# Patient Record
Sex: FEMALE | Race: BLACK OR AFRICAN AMERICAN | NOT HISPANIC OR LATINO | Employment: FULL TIME | URBAN - METROPOLITAN AREA
[De-identification: names, ages, dates, MRNs, and addresses within clinical notes are randomized per-mention and may not be internally consistent; named-entity substitution may affect disease eponyms.]

---

## 2020-05-22 ENCOUNTER — HOSPITAL ENCOUNTER (OUTPATIENT)
Dept: RADIOLOGY | Facility: HOSPITAL | Age: 50
Discharge: HOME | End: 2020-05-22
Attending: INTERNAL MEDICINE
Payer: OTHER GOVERNMENT

## 2020-05-22 DIAGNOSIS — Z12.31 VISIT FOR SCREENING MAMMOGRAM: ICD-10-CM

## 2020-05-22 PROCEDURE — 77063 MAMMO DIGITAL SCREENING BILAT WITH TOMOSYNTHESIS_CAD: ICD-10-PCS | Mod: 26,,, | Performed by: RADIOLOGY

## 2020-05-22 PROCEDURE — 77067 SCR MAMMO BI INCL CAD: CPT | Mod: TC

## 2020-05-22 PROCEDURE — 77063 BREAST TOMOSYNTHESIS BI: CPT | Mod: 26,,, | Performed by: RADIOLOGY

## 2020-05-22 PROCEDURE — 77067 MAMMO DIGITAL SCREENING BILAT WITH TOMOSYNTHESIS_CAD: ICD-10-PCS | Mod: 26,,, | Performed by: RADIOLOGY

## 2020-05-22 PROCEDURE — 77067 SCR MAMMO BI INCL CAD: CPT | Mod: 26,,, | Performed by: RADIOLOGY

## 2020-08-04 LAB — HUMAN PAPILLOMAVIRUS (HPV): NORMAL

## 2020-10-06 ENCOUNTER — OFFICE VISIT (OUTPATIENT)
Dept: OTOLARYNGOLOGY | Facility: CLINIC | Age: 50
End: 2020-10-06
Payer: COMMERCIAL

## 2020-10-06 DIAGNOSIS — J30.89 NON-SEASONAL ALLERGIC RHINITIS, UNSPECIFIED TRIGGER: Primary | ICD-10-CM

## 2020-10-06 DIAGNOSIS — J32.3 CHRONIC SPHENOIDAL SINUSITIS: ICD-10-CM

## 2020-10-06 PROCEDURE — 99999 PR PBB SHADOW E&M-EST. PATIENT-LVL III: ICD-10-PCS | Mod: PBBFAC,,, | Performed by: ORTHOPAEDIC SURGERY

## 2020-10-06 PROCEDURE — 99999 PR PBB SHADOW E&M-EST. PATIENT-LVL III: CPT | Mod: PBBFAC,,, | Performed by: ORTHOPAEDIC SURGERY

## 2020-10-06 PROCEDURE — 99204 OFFICE O/P NEW MOD 45 MIN: CPT | Mod: S$GLB,,, | Performed by: ORTHOPAEDIC SURGERY

## 2020-10-06 PROCEDURE — 99204 PR OFFICE/OUTPT VISIT, NEW, LEVL IV, 45-59 MIN: ICD-10-PCS | Mod: S$GLB,,, | Performed by: ORTHOPAEDIC SURGERY

## 2020-10-06 PROCEDURE — 99213 OFFICE O/P EST LOW 20 MIN: CPT | Mod: PBBFAC | Performed by: ORTHOPAEDIC SURGERY

## 2020-10-06 RX ORDER — MONTELUKAST SODIUM 10 MG/1
10 TABLET ORAL NIGHTLY
COMMUNITY

## 2020-10-06 RX ORDER — TIZANIDINE HYDROCHLORIDE 4 MG/1
4 CAPSULE, GELATIN COATED ORAL 2 TIMES DAILY PRN
COMMUNITY
Start: 2020-08-26 | End: 2022-05-27

## 2020-10-06 RX ORDER — CELECOXIB 200 MG/1
200 CAPSULE ORAL
COMMUNITY
End: 2021-05-21

## 2020-10-06 RX ORDER — LINACLOTIDE 145 UG/1
145 CAPSULE, GELATIN COATED ORAL
COMMUNITY
Start: 2020-09-18 | End: 2021-11-03

## 2020-10-06 RX ORDER — ALBUTEROL SULFATE 90 UG/1
2 AEROSOL, METERED RESPIRATORY (INHALATION) EVERY 6 HOURS PRN
COMMUNITY

## 2020-10-06 RX ORDER — MULTIVITAMIN
1 TABLET ORAL
COMMUNITY

## 2020-10-06 RX ORDER — PREGABALIN 75 MG/1
75 CAPSULE ORAL
COMMUNITY
Start: 2020-10-02 | End: 2021-05-31

## 2020-10-06 RX ORDER — CETIRIZINE HYDROCHLORIDE 10 MG/1
10 TABLET ORAL NIGHTLY
COMMUNITY

## 2020-10-06 RX ORDER — OXYBUTYNIN CHLORIDE 5 MG/1
TABLET, EXTENDED RELEASE ORAL
COMMUNITY
Start: 2020-08-25 | End: 2020-12-23

## 2020-10-06 RX ORDER — GLUCOSAMINE/CHONDRO SU A 500-400 MG
2 TABLET ORAL
COMMUNITY
End: 2021-05-21

## 2020-10-06 NOTE — PROGRESS NOTES
Subjective:      Patient ID: Jolly Galeas is a 50 y.o. female.    Chief Complaint: Sinus Problem and Headache (Migranes )    Patient is a very pleasant 50-year-old female here to see me today for the 1st time for evaluation of several different issues.  First, she does have a significant history of migraines in the past for which she is on maximal medical management.  She has recently experienced several episodes of dizziness which she does identify as a true spinning sensation.  She does have allergies, and takes an antihistamine decongestant combination on a daily basis as well as Singulair.  She is not currently on Flonase, she is unsure of why she stopped taking it in the past but did find it helpful.  She did have allergy testing in 2016, and did have shots twice weekly for about a year and then monthly after that.  She has recently [facility] both an MRI as well as [facility] and is here today for further evaluation.        Review of Systems   Constitutional: Negative for chills, fatigue, fever and unexpected weight change.   HENT: Positive for congestion, postnasal drip, rhinorrhea and sinus pressure. Negative for dental problem, ear discharge, ear pain, facial swelling, hearing loss, nosebleeds, sneezing, sore throat, tinnitus, trouble swallowing and voice change.    Eyes: Negative for redness, itching and visual disturbance.   Respiratory: Negative for cough, choking, shortness of breath and wheezing.    Cardiovascular: Negative for chest pain and palpitations.   Gastrointestinal: Negative for abdominal pain.        No reflux.   Musculoskeletal: Negative for gait problem.   Skin: Negative for rash.   Allergic/Immunologic: Positive for environmental allergies.   Neurological: Positive for dizziness and headaches. Negative for light-headedness.       Objective:       Physical Exam  Constitutional:       General: She is not in acute distress.     Appearance: She is well-developed.   HENT:       Head: Normocephalic and atraumatic.      Right Ear: Tympanic membrane, ear canal and external ear normal.      Left Ear: Tympanic membrane, ear canal and external ear normal.      Nose: Mucosal edema and rhinorrhea present. No nasal deformity or septal deviation.      Right Sinus: No maxillary sinus tenderness or frontal sinus tenderness.      Left Sinus: No maxillary sinus tenderness or frontal sinus tenderness.      Mouth/Throat:      Mouth: Mucous membranes are not pale and not dry.      Dentition: No dental caries.      Pharynx: Uvula midline. No oropharyngeal exudate or posterior oropharyngeal erythema.   Eyes:      General: Lids are normal. No scleral icterus.     Extraocular Movements:      Right eye: Normal extraocular motion and no nystagmus.      Left eye: Normal extraocular motion and no nystagmus.      Conjunctiva/sclera: Conjunctivae normal.      Right eye: Right conjunctiva is not injected. No chemosis.     Left eye: Left conjunctiva is not injected. No chemosis.     Pupils: Pupils are equal, round, and reactive to light.   Neck:      Thyroid: No thyroid mass or thyromegaly.      Trachea: Trachea and phonation normal. No tracheal tenderness or tracheal deviation.   Pulmonary:      Effort: Pulmonary effort is normal. No respiratory distress.      Breath sounds: No stridor.   Abdominal:      General: There is no distension.   Lymphadenopathy:      Head:      Right side of head: No submental, submandibular, preauricular, posterior auricular or occipital adenopathy.      Left side of head: No submental, submandibular, preauricular, posterior auricular or occipital adenopathy.      Cervical: No cervical adenopathy.   Skin:     General: Skin is warm and dry.      Findings: No erythema or rash.   Neurological:      Mental Status: She is alert and oriented to person, place, and time.      Cranial Nerves: No cranial nerve deficit.   Psychiatric:         Behavior: Behavior normal.     CT SINUS:          MRI  HEAD:      Assessment:       1. Non-seasonal allergic rhinitis, unspecified trigger    2. Chronic sphenoidal sinusitis        Plan:     Non-seasonal allergic rhinitis, unspecified trigger:   I would recommend the patient begin to use a steroid nasal spray, and we discussed in detail the proper mechanism of use directing the spray away from the nasal septum.  In addition, we also discussed that it will take two to three weeks of daily use to achieve maximal effectiveness.  Nasal steroid sprays are now available in a variety of brands OTC.  The patient will please call in 2-3 weeks with their progress.  If allergy symptoms persist at that time, we could consider additional allergy testing.    Chronic sphenoidal sinusitis  -     CT Sinuses without Contrast; Future; Expected date: 10/06/2020    Discussed that her symptoms of dizziness and headache her most likely related to her known diagnosis of migraines.  Incidental finding of a small mucous retention cyst in the sphenoid.  Will repeat imaging in 6 months to ensure stability, otherwise no further evaluation or intervention is indicated.  Call for sooner appointment if needed.

## 2020-10-06 NOTE — LETTER
October 12, 2020      [facility]  [address] [place] [address]           [facility]  [address]  [place] [address]  Phone: [phone]  Fax: [phone]          Patient: Jolly Galeas   MR Number: [id number]   YOB: 1970   Date of Visit: 10/6/2020       Dear [facility]:    Thank you for referring Jolly Galeas to me for evaluation. Attached you will find relevant portions of my assessment and plan of care.    If you have questions, please do not hesitate to call me. I look forward to following Jolly Galeas along with you.    Sincerely,    Clarisa Flower MD    Enclosure  CC:  No Recipients    If you would like to receive this communication electronically, please contact [email or web address] or [phone] to request more information on [facility] access.    For providers and/or their staff who would like to refer a patient to [facility], please contact us through our one-stop-shop provider referral line, Enrique Costa, at [phone].    If you feel you have received this communication in error or would no longer like to receive these types of communications, please e-mail [email or web address]

## 2020-12-23 ENCOUNTER — LAB VISIT (OUTPATIENT)
Dept: LAB | Facility: HOSPITAL | Age: 50
End: 2020-12-23
Attending: FAMILY MEDICINE
Payer: OTHER GOVERNMENT

## 2020-12-23 ENCOUNTER — OFFICE VISIT (OUTPATIENT)
Dept: INTERNAL MEDICINE | Facility: CLINIC | Age: 50
End: 2020-12-23
Payer: OTHER GOVERNMENT

## 2020-12-23 VITALS
WEIGHT: 231.69 LBS | DIASTOLIC BLOOD PRESSURE: 78 MMHG | HEART RATE: 89 BPM | SYSTOLIC BLOOD PRESSURE: 110 MMHG | TEMPERATURE: 98 F | OXYGEN SATURATION: 96 %

## 2020-12-23 DIAGNOSIS — Z00.00 ROUTINE GENERAL MEDICAL EXAMINATION AT A HEALTH CARE FACILITY: Primary | ICD-10-CM

## 2020-12-23 DIAGNOSIS — Z00.00 ROUTINE GENERAL MEDICAL EXAMINATION AT A HEALTH CARE FACILITY: ICD-10-CM

## 2020-12-23 DIAGNOSIS — F41.9 ANXIETY: ICD-10-CM

## 2020-12-23 DIAGNOSIS — J45.40 MODERATE PERSISTENT ASTHMA WITHOUT COMPLICATION: ICD-10-CM

## 2020-12-23 DIAGNOSIS — G43.019 INTRACTABLE MIGRAINE WITHOUT AURA AND WITHOUT STATUS MIGRAINOSUS: ICD-10-CM

## 2020-12-23 DIAGNOSIS — G47.429 NARCOLEPSY DUE TO UNDERLYING CONDITION WITHOUT CATAPLEXY: ICD-10-CM

## 2020-12-23 DIAGNOSIS — M17.0 PRIMARY OSTEOARTHRITIS OF BOTH KNEES: ICD-10-CM

## 2020-12-23 DIAGNOSIS — K21.9 GASTROESOPHAGEAL REFLUX DISEASE, UNSPECIFIED WHETHER ESOPHAGITIS PRESENT: ICD-10-CM

## 2020-12-23 DIAGNOSIS — F31.32 BIPOLAR DISORDER WITH MODERATE DEPRESSION: ICD-10-CM

## 2020-12-23 DIAGNOSIS — K58.1 IRRITABLE BOWEL SYNDROME WITH CONSTIPATION: ICD-10-CM

## 2020-12-23 DIAGNOSIS — G47.33 OSA ON CPAP: ICD-10-CM

## 2020-12-23 DIAGNOSIS — J98.59 MEDIASTINAL MASS: ICD-10-CM

## 2020-12-23 DIAGNOSIS — Z91.09 ENVIRONMENTAL ALLERGIES: ICD-10-CM

## 2020-12-23 DIAGNOSIS — Z80.0 FAMILY HISTORY OF COLON CANCER: ICD-10-CM

## 2020-12-23 DIAGNOSIS — F43.10 PTSD (POST-TRAUMATIC STRESS DISORDER): ICD-10-CM

## 2020-12-23 DIAGNOSIS — R91.1 LUNG NODULE: ICD-10-CM

## 2020-12-23 PROBLEM — G43.909 MIGRAINE: Status: ACTIVE | Noted: 2020-12-23

## 2020-12-23 PROBLEM — J30.2 SEASONAL ALLERGIES: Status: ACTIVE | Noted: 2020-12-23

## 2020-12-23 PROBLEM — F43.12 CHRONIC POST-TRAUMATIC STRESS DISORDER (PTSD) AFTER MILITARY COMBAT: Status: RESOLVED | Noted: 2020-12-23 | Resolved: 2020-12-23

## 2020-12-23 PROBLEM — F32.A DEPRESSION: Status: RESOLVED | Noted: 2020-12-23 | Resolved: 2020-12-23

## 2020-12-23 PROBLEM — F33.1 MODERATE EPISODE OF RECURRENT MAJOR DEPRESSIVE DISORDER: Status: ACTIVE | Noted: 2017-10-09

## 2020-12-23 PROBLEM — G47.419 NARCOLEPSY: Status: ACTIVE | Noted: 2020-12-23

## 2020-12-23 PROBLEM — F32.A DEPRESSION: Status: ACTIVE | Noted: 2020-12-23

## 2020-12-23 PROBLEM — J45.909 UNCOMPLICATED ASTHMA: Status: RESOLVED | Noted: 2017-06-29 | Resolved: 2020-12-23

## 2020-12-23 PROBLEM — F31.9 BIPOLAR DISORDER: Status: ACTIVE | Noted: 2020-12-23

## 2020-12-23 PROBLEM — J30.2 SEASONAL ALLERGIES: Status: RESOLVED | Noted: 2020-12-23 | Resolved: 2020-12-23

## 2020-12-23 PROBLEM — K58.9 IRRITABLE BOWEL SYNDROME: Status: ACTIVE | Noted: 2020-12-23

## 2020-12-23 PROBLEM — G47.30 SLEEP APNEA: Status: ACTIVE | Noted: 2020-12-23

## 2020-12-23 PROBLEM — F43.12 CHRONIC POST-TRAUMATIC STRESS DISORDER (PTSD) AFTER MILITARY COMBAT: Status: ACTIVE | Noted: 2020-12-23

## 2020-12-23 PROBLEM — F33.1 MODERATE EPISODE OF RECURRENT MAJOR DEPRESSIVE DISORDER: Status: RESOLVED | Noted: 2017-10-09 | Resolved: 2020-12-23

## 2020-12-23 PROBLEM — J45.909 UNCOMPLICATED ASTHMA: Status: ACTIVE | Noted: 2017-06-29

## 2020-12-23 LAB
ALBUMIN SERPL BCP-MCNC: 3.7 G/DL (ref 3.5–5.2)
ALP SERPL-CCNC: 136 U/L (ref 55–135)
ALT SERPL W/O P-5'-P-CCNC: 148 U/L (ref 10–44)
ANION GAP SERPL CALC-SCNC: 8 MMOL/L (ref 8–16)
AST SERPL-CCNC: 240 U/L (ref 10–40)
BASOPHILS # BLD AUTO: 0.03 K/UL (ref 0–0.2)
BASOPHILS NFR BLD: 0.5 % (ref 0–1.9)
BILIRUB SERPL-MCNC: 0.4 MG/DL (ref 0.1–1)
BUN SERPL-MCNC: 12 MG/DL (ref 6–20)
CALCIUM SERPL-MCNC: 9.1 MG/DL (ref 8.7–10.5)
CHLORIDE SERPL-SCNC: 103 MMOL/L (ref 95–110)
CHOLEST SERPL-MCNC: 175 MG/DL (ref 120–199)
CHOLEST/HDLC SERPL: 4.1 {RATIO} (ref 2–5)
CO2 SERPL-SCNC: 29 MMOL/L (ref 23–29)
CREAT SERPL-MCNC: 1 MG/DL (ref 0.5–1.4)
DIFFERENTIAL METHOD: ABNORMAL
EOSINOPHIL # BLD AUTO: 0.1 K/UL (ref 0–0.5)
EOSINOPHIL NFR BLD: 2 % (ref 0–8)
ERYTHROCYTE [DISTWIDTH] IN BLOOD BY AUTOMATED COUNT: 14.8 % (ref 11.5–14.5)
EST. GFR  (AFRICAN AMERICAN): >60 ML/MIN/1.73 M^2
EST. GFR  (NON AFRICAN AMERICAN): >60 ML/MIN/1.73 M^2
GLUCOSE SERPL-MCNC: 97 MG/DL (ref 70–110)
HCT VFR BLD AUTO: 41.5 % (ref 37–48.5)
HDLC SERPL-MCNC: 43 MG/DL (ref 40–75)
HDLC SERPL: 24.6 % (ref 20–50)
HGB BLD-MCNC: 13.4 G/DL (ref 12–16)
IMM GRANULOCYTES # BLD AUTO: 0.01 K/UL (ref 0–0.04)
IMM GRANULOCYTES NFR BLD AUTO: 0.2 % (ref 0–0.5)
LDLC SERPL CALC-MCNC: 103.2 MG/DL (ref 63–159)
LYMPHOCYTES # BLD AUTO: 1.3 K/UL (ref 1–4.8)
LYMPHOCYTES NFR BLD: 20.8 % (ref 18–48)
MCH RBC QN AUTO: 27.7 PG (ref 27–31)
MCHC RBC AUTO-ENTMCNC: 32.3 G/DL (ref 32–36)
MCV RBC AUTO: 86 FL (ref 82–98)
MONOCYTES # BLD AUTO: 0.4 K/UL (ref 0.3–1)
MONOCYTES NFR BLD: 7.2 % (ref 4–15)
NEUTROPHILS # BLD AUTO: 4.3 K/UL (ref 1.8–7.7)
NEUTROPHILS NFR BLD: 69.3 % (ref 38–73)
NONHDLC SERPL-MCNC: 132 MG/DL
NRBC BLD-RTO: 0 /100 WBC
PLATELET # BLD AUTO: 287 K/UL (ref 150–350)
PMV BLD AUTO: 10.8 FL (ref 9.2–12.9)
POTASSIUM SERPL-SCNC: 4.2 MMOL/L (ref 3.5–5.1)
PROT SERPL-MCNC: 7.4 G/DL (ref 6–8.4)
RBC # BLD AUTO: 4.83 M/UL (ref 4–5.4)
SODIUM SERPL-SCNC: 140 MMOL/L (ref 136–145)
TRIGL SERPL-MCNC: 144 MG/DL (ref 30–150)
TSH SERPL DL<=0.005 MIU/L-ACNC: 0.94 UIU/ML (ref 0.4–4)
WBC # BLD AUTO: 6.12 K/UL (ref 3.9–12.7)

## 2020-12-23 PROCEDURE — 80061 LIPID PANEL: CPT

## 2020-12-23 PROCEDURE — 84443 ASSAY THYROID STIM HORMONE: CPT

## 2020-12-23 PROCEDURE — 85025 COMPLETE CBC W/AUTO DIFF WBC: CPT

## 2020-12-23 PROCEDURE — 86803 HEPATITIS C AB TEST: CPT

## 2020-12-23 PROCEDURE — 86703 HIV-1/HIV-2 1 RESULT ANTBDY: CPT

## 2020-12-23 PROCEDURE — 99386 PREV VISIT NEW AGE 40-64: CPT | Mod: S$GLB,,, | Performed by: FAMILY MEDICINE

## 2020-12-23 PROCEDURE — 99999 PR PBB SHADOW E&M-EST. PATIENT-LVL V: ICD-10-PCS | Mod: PBBFAC,,, | Performed by: FAMILY MEDICINE

## 2020-12-23 PROCEDURE — 99999 PR PBB SHADOW E&M-EST. PATIENT-LVL V: CPT | Mod: PBBFAC,,, | Performed by: FAMILY MEDICINE

## 2020-12-23 PROCEDURE — 80053 COMPREHEN METABOLIC PANEL: CPT

## 2020-12-23 PROCEDURE — 36415 COLL VENOUS BLD VENIPUNCTURE: CPT

## 2020-12-23 PROCEDURE — 99386 PR PREVENTIVE VISIT,NEW,40-64: ICD-10-PCS | Mod: S$GLB,,, | Performed by: FAMILY MEDICINE

## 2020-12-23 RX ORDER — ERENUMAB-AOOE 140 MG/ML
140 INJECTION, SOLUTION SUBCUTANEOUS
COMMUNITY

## 2020-12-23 RX ORDER — UBROGEPANT 100 MG/1
100 TABLET ORAL ONCE AS NEEDED
COMMUNITY

## 2020-12-23 RX ORDER — ZONISAMIDE 100 MG/1
CAPSULE ORAL
COMMUNITY
Start: 2020-03-30 | End: 2020-12-23

## 2020-12-23 RX ORDER — ESCITALOPRAM OXALATE 10 MG/1
10 TABLET ORAL NIGHTLY
COMMUNITY
Start: 2020-12-04

## 2020-12-23 RX ORDER — PANTOPRAZOLE SODIUM 40 MG/1
40 TABLET, DELAYED RELEASE ORAL DAILY
COMMUNITY
Start: 2020-10-16 | End: 2021-06-28 | Stop reason: SDUPTHER

## 2020-12-23 RX ORDER — PROCHLORPERAZINE MALEATE 5 MG
TABLET ORAL
COMMUNITY
Start: 2020-03-24 | End: 2021-05-21

## 2020-12-23 RX ORDER — NALTREXONE HYDROCHLORIDE 50 MG/1
50 TABLET, FILM COATED ORAL NIGHTLY
COMMUNITY
Start: 2020-12-04

## 2020-12-23 RX ORDER — LAMOTRIGINE 25 MG/1
TABLET ORAL
COMMUNITY
Start: 2020-12-04 | End: 2021-05-31

## 2020-12-23 RX ORDER — CLONAZEPAM 0.5 MG/1
0.5 TABLET ORAL 2 TIMES DAILY
COMMUNITY
Start: 2020-12-04

## 2020-12-23 RX ORDER — BUPROPION HYDROCHLORIDE 150 MG/1
450 TABLET ORAL NIGHTLY
COMMUNITY
Start: 2020-12-04

## 2020-12-23 RX ORDER — MAGNESIUM OXIDE 420 MG/1
420 TABLET ORAL NIGHTLY
COMMUNITY
Start: 2020-03-24

## 2020-12-23 NOTE — PROGRESS NOTES
Subjective:       Patient ID: Jolly Galeas is a 50 y.o. female.    Chief Complaint: Establish Care    50-year-old  female patient new to my clinic here to establish care.  Patient with Patient Active Problem List:     Anxiety     Bipolar disorder with moderate depression     Environmental allergies     Family history of colon cancer     GERD (gastroesophageal reflux disease)     Irritable bowel syndrome with constipation     Lung nodule     Intractable migraine without aura and without status migrainosus     Narcolepsy     Primary osteoarthritis of both knees     PTSD (post-traumatic stress disorder)     SUNIL on CPAP     Mediastinal mass  Reports that [facility] and [facility].  Patient is here for routine annual physicals, has been scheduled to see ENT secondary to CT scan of the head showing cyst but has been watched by ENT Dr Flower  Patient secondary to lung nodule and mediastinal mass has been followed by Dr. Egan at [facility] pulmonology, [facility] and reports that it has been stable.  Patient secondary to bipolar depression anxiety and posttraumatic stress disorder has been doing well with [facility].   Has been using CPAP machine with sleep apnea and narcolepsy and has been doing well  Patient [facility] with constipation taking MiraLax and Linzess.  Denies any blood in the stool.   Is up-to-date with Pap smear at [facility].   Denies any chest pain or difficulty breathing or palpitations, Has not been exercising lately.   Works as a nurse practitioner at [employer]     Review of Systems   Constitutional: Negative for fatigue.   Eyes: Negative for visual disturbance.   Respiratory: Negative for shortness of breath.    Cardiovascular: Negative for chest pain and leg swelling.   Gastrointestinal: Positive for constipation. Negative for abdominal pain, blood in stool, nausea and vomiting.   Musculoskeletal:  Negative for myalgias.   Skin: Negative for rash.   Neurological: Negative for light-headedness and headaches.   Psychiatric/Behavioral: Positive for decreased concentration and sleep disturbance. Negative for self-injury and suicidal ideas. The patient is nervous/anxious.          /78 (BP Location: Right arm, Patient Position: Sitting, BP Method: Large (Manual))   Pulse 89   Temp 97.8 °F (36.6 °C) (Temporal)   Wt 105.1 kg (231 lb 11.3 oz)   SpO2 96%   Objective:      Physical Exam  Constitutional:       Appearance: She is well-developed.   HENT:      Head: Normocephalic and atraumatic.   Cardiovascular:      Rate and Rhythm: Normal rate and regular rhythm.      Heart sounds: Normal heart sounds. No murmur.   Pulmonary:      Effort: Pulmonary effort is normal.      Breath sounds: Normal breath sounds. No wheezing.   Abdominal:      General: Bowel sounds are normal.      Palpations: Abdomen is soft.      Tenderness: There is no abdominal tenderness.   Skin:     General: Skin is warm and dry.      Findings: No rash.   Neurological:      Mental Status: She is alert and oriented to person, place, and time.   Psychiatric:         Mood and Affect: Mood normal.           Assessment/Plan:   1. Routine general medical examination at a health care facility  - CBC Auto Differential; Future  - Comprehensive Metabolic Panel; Future  - Lipid Panel; Future  - TSH; Future  - Urinalysis; Future  - Hepatitis C Antibody; Future  - HIV 1/2 Ag/Ab (4th Gen); Future  Vital signs stable today.  Clinical exam stable  Continue lifestyle modifications with low-fat and low-cholesterol diet and exercise 30 min daily  Up-to-date with flu shot and reported that she has received COVID vaccine 1st dose  She encouraged to consider getting shingles vaccination if interested outside pharmacy    2. Moderate persistent asthma without complication  Currently on Symbicort and albuterol inhaler as needed, followed by Dr. Egan    3. Mediastinal  mass  11. Lung nodule  4. SUNIL on CPAP  Using CPAP machine regularly  Clinically stable and asymptomatic secondary to lung nodule and mediastinal mass as reported by patient followed by [facility] pulmonary Dr Egan    5. PTSD (post-traumatic stress disorder)  6. Bipolar disorder with moderate depression  7. Anxiety  [facility]    8. Environmental allergies  Stable on Zyrtec and Singulair    9. Gastroesophageal reflux disease, unspecified whether esophagitis present  Stable on pantoprazole 40 mg daily    10. Irritable bowel syndrome with constipation  On linzess and MiraLax followed by Dr. Espitia, recently had a colonoscopy 2 months ago    12. Intractable migraine without aura and without status migrainosus  13. Narcolepsy due to underlying condition without cataplexy  Stable on Botox injections and Aimovig    14. Primary osteoarthritis of both knees  Graded exercise regimen recommended    15. Family history of colon cancer

## 2020-12-24 ENCOUNTER — PATIENT MESSAGE (OUTPATIENT)
Dept: INTERNAL MEDICINE | Facility: CLINIC | Age: 50
End: 2020-12-24

## 2020-12-24 DIAGNOSIS — R74.8 ELEVATED LIVER ENZYMES: Primary | ICD-10-CM

## 2020-12-24 LAB
HCV AB SERPL QL IA: NEGATIVE
HIV 1+2 AB+HIV1 P24 AG SERPL QL IA: NEGATIVE

## 2020-12-29 ENCOUNTER — TELEPHONE (OUTPATIENT)
Dept: RADIOLOGY | Facility: HOSPITAL | Age: 50
End: 2020-12-29

## 2020-12-30 ENCOUNTER — HOSPITAL ENCOUNTER (OUTPATIENT)
Dept: RADIOLOGY | Facility: HOSPITAL | Age: 50
Discharge: HOME | End: 2020-12-30
Attending: FAMILY MEDICINE
Payer: OTHER GOVERNMENT

## 2020-12-30 ENCOUNTER — PATIENT MESSAGE (OUTPATIENT)
Dept: INTERNAL MEDICINE | Facility: CLINIC | Age: 50
End: 2020-12-30

## 2020-12-30 DIAGNOSIS — R17 JAUNDICE: ICD-10-CM

## 2020-12-30 DIAGNOSIS — R74.8 ELEVATED LIVER ENZYMES: ICD-10-CM

## 2020-12-30 DIAGNOSIS — K83.8 COMMON BILE DUCT DILATATION: Primary | ICD-10-CM

## 2020-12-30 PROCEDURE — 76700 US EXAM ABDOM COMPLETE: CPT | Mod: 26,,, | Performed by: RADIOLOGY

## 2020-12-30 PROCEDURE — 76700 US ABDOMEN COMPLETE: ICD-10-PCS | Mod: 26,,, | Performed by: RADIOLOGY

## 2020-12-30 PROCEDURE — 76700 US EXAM ABDOM COMPLETE: CPT | Mod: TC

## 2021-01-07 ENCOUNTER — PATIENT MESSAGE (OUTPATIENT)
Dept: INTERNAL MEDICINE | Facility: CLINIC | Age: 51
End: 2021-01-07

## 2021-01-13 ENCOUNTER — PATIENT MESSAGE (OUTPATIENT)
Dept: INTERNAL MEDICINE | Facility: CLINIC | Age: 51
End: 2021-01-13

## 2021-01-19 ENCOUNTER — PATIENT OUTREACH (OUTPATIENT)
Dept: ADMINISTRATIVE | Facility: HOSPITAL | Age: 51
End: 2021-01-19

## 2021-01-22 ENCOUNTER — PATIENT MESSAGE (OUTPATIENT)
Dept: INTERNAL MEDICINE | Facility: CLINIC | Age: 51
End: 2021-01-22

## 2021-01-25 ENCOUNTER — PATIENT OUTREACH (OUTPATIENT)
Dept: ADMINISTRATIVE | Facility: HOSPITAL | Age: 51
End: 2021-01-25

## 2021-01-29 ENCOUNTER — TELEPHONE (OUTPATIENT)
Dept: INTERNAL MEDICINE | Facility: CLINIC | Age: 51
End: 2021-01-29

## 2021-01-29 DIAGNOSIS — K83.8 COMMON BILE DUCT DILATATION: ICD-10-CM

## 2021-01-29 DIAGNOSIS — R74.8 ELEVATED LIVER ENZYMES: Primary | ICD-10-CM

## 2021-02-01 ENCOUNTER — PATIENT MESSAGE (OUTPATIENT)
Dept: HEPATOLOGY | Facility: CLINIC | Age: 51
End: 2021-02-01

## 2021-02-03 ENCOUNTER — PATIENT MESSAGE (OUTPATIENT)
Dept: INTERNAL MEDICINE | Facility: CLINIC | Age: 51
End: 2021-02-03

## 2021-02-03 ENCOUNTER — OFFICE VISIT (OUTPATIENT)
Dept: INTERNAL MEDICINE | Facility: CLINIC | Age: 51
End: 2021-02-03
Payer: OTHER GOVERNMENT

## 2021-02-03 DIAGNOSIS — B00.9 HSV-2 INFECTION: Primary | ICD-10-CM

## 2021-02-03 PROCEDURE — 99213 PR OFFICE/OUTPT VISIT, EST, LEVL III, 20-29 MIN: ICD-10-PCS | Mod: 95,,, | Performed by: NURSE PRACTITIONER

## 2021-02-03 PROCEDURE — 99213 OFFICE O/P EST LOW 20 MIN: CPT | Mod: 95,,, | Performed by: NURSE PRACTITIONER

## 2021-02-03 RX ORDER — VALACYCLOVIR HYDROCHLORIDE 500 MG/1
500 TABLET, FILM COATED ORAL 2 TIMES DAILY
Qty: 10 TABLET | Refills: 0 | Status: SHIPPED | OUTPATIENT
Start: 2021-02-03 | End: 2021-05-21

## 2021-02-08 ENCOUNTER — PATIENT MESSAGE (OUTPATIENT)
Dept: INTERNAL MEDICINE | Facility: CLINIC | Age: 51
End: 2021-02-08

## 2021-04-04 ENCOUNTER — PATIENT MESSAGE (OUTPATIENT)
Dept: OTOLARYNGOLOGY | Facility: CLINIC | Age: 51
End: 2021-04-04

## 2021-05-08 ENCOUNTER — PATIENT MESSAGE (OUTPATIENT)
Dept: INTERNAL MEDICINE | Facility: CLINIC | Age: 51
End: 2021-05-08

## 2021-05-17 ENCOUNTER — LAB VISIT (OUTPATIENT)
Dept: LAB | Facility: HOSPITAL | Age: 51
End: 2021-05-17
Attending: SURGERY
Payer: OTHER GOVERNMENT

## 2021-05-17 ENCOUNTER — OFFICE VISIT (OUTPATIENT)
Dept: SURGERY | Facility: CLINIC | Age: 51
End: 2021-05-17
Payer: OTHER GOVERNMENT

## 2021-05-17 VITALS
HEIGHT: 67 IN | BODY MASS INDEX: 37.92 KG/M2 | DIASTOLIC BLOOD PRESSURE: 74 MMHG | HEART RATE: 90 BPM | WEIGHT: 241.63 LBS | SYSTOLIC BLOOD PRESSURE: 127 MMHG | TEMPERATURE: 97 F

## 2021-05-17 DIAGNOSIS — K21.9 GASTROESOPHAGEAL REFLUX DISEASE, UNSPECIFIED WHETHER ESOPHAGITIS PRESENT: ICD-10-CM

## 2021-05-17 DIAGNOSIS — M17.0 OSTEOARTHRITIS OF BOTH KNEES, UNSPECIFIED OSTEOARTHRITIS TYPE: ICD-10-CM

## 2021-05-17 DIAGNOSIS — K21.9 GASTROESOPHAGEAL REFLUX DISEASE, UNSPECIFIED WHETHER ESOPHAGITIS PRESENT: Primary | ICD-10-CM

## 2021-05-17 DIAGNOSIS — G47.33 OSA ON CPAP: ICD-10-CM

## 2021-05-17 PROCEDURE — 83540 ASSAY OF IRON: CPT | Performed by: SURGERY

## 2021-05-17 PROCEDURE — 84134 ASSAY OF PREALBUMIN: CPT | Performed by: SURGERY

## 2021-05-17 PROCEDURE — 82306 VITAMIN D 25 HYDROXY: CPT | Performed by: SURGERY

## 2021-05-17 PROCEDURE — 99215 OFFICE O/P EST HI 40 MIN: CPT | Mod: PBBFAC | Performed by: SURGERY

## 2021-05-17 PROCEDURE — 99999 PR PBB SHADOW E&M-EST. PATIENT-LVL V: ICD-10-PCS | Mod: PBBFAC,,, | Performed by: SURGERY

## 2021-05-17 PROCEDURE — 85025 COMPLETE CBC W/AUTO DIFF WBC: CPT | Performed by: SURGERY

## 2021-05-17 PROCEDURE — 99204 OFFICE O/P NEW MOD 45 MIN: CPT | Mod: S$GLB,,, | Performed by: SURGERY

## 2021-05-17 PROCEDURE — 84425 ASSAY OF VITAMIN B-1: CPT | Performed by: SURGERY

## 2021-05-17 PROCEDURE — 80053 COMPREHEN METABOLIC PANEL: CPT | Performed by: SURGERY

## 2021-05-17 PROCEDURE — 36415 COLL VENOUS BLD VENIPUNCTURE: CPT | Performed by: SURGERY

## 2021-05-17 PROCEDURE — 99204 PR OFFICE/OUTPT VISIT, NEW, LEVL IV, 45-59 MIN: ICD-10-PCS | Mod: S$GLB,,, | Performed by: SURGERY

## 2021-05-17 PROCEDURE — 82607 VITAMIN B-12: CPT | Performed by: SURGERY

## 2021-05-17 PROCEDURE — 84439 ASSAY OF FREE THYROXINE: CPT | Performed by: SURGERY

## 2021-05-17 PROCEDURE — 83036 HEMOGLOBIN GLYCOSYLATED A1C: CPT | Performed by: SURGERY

## 2021-05-17 PROCEDURE — 84443 ASSAY THYROID STIM HORMONE: CPT | Performed by: SURGERY

## 2021-05-17 PROCEDURE — 99999 PR PBB SHADOW E&M-EST. PATIENT-LVL V: CPT | Mod: PBBFAC,,, | Performed by: SURGERY

## 2021-05-17 RX ORDER — MEMANTINE HYDROCHLORIDE 5 MG/1
5 TABLET ORAL DAILY
COMMUNITY
End: 2021-06-28

## 2021-05-18 ENCOUNTER — PATIENT MESSAGE (OUTPATIENT)
Dept: SURGERY | Facility: CLINIC | Age: 51
End: 2021-05-18

## 2021-05-18 ENCOUNTER — PATIENT MESSAGE (OUTPATIENT)
Dept: ENDOSCOPY | Facility: HOSPITAL | Age: 51
End: 2021-05-18

## 2021-05-18 ENCOUNTER — TELEPHONE (OUTPATIENT)
Dept: ENDOSCOPY | Facility: HOSPITAL | Age: 51
End: 2021-05-18

## 2021-05-18 LAB
25(OH)D3+25(OH)D2 SERPL-MCNC: 39 NG/ML (ref 30–96)
ALBUMIN SERPL BCP-MCNC: 3.6 G/DL (ref 3.5–5.2)
ALP SERPL-CCNC: 95 U/L (ref 55–135)
ALT SERPL W/O P-5'-P-CCNC: 48 U/L (ref 10–44)
ANION GAP SERPL CALC-SCNC: 9 MMOL/L (ref 8–16)
AST SERPL-CCNC: 37 U/L (ref 10–40)
BASOPHILS # BLD AUTO: 0.03 K/UL (ref 0–0.2)
BASOPHILS NFR BLD: 0.3 % (ref 0–1.9)
BILIRUB SERPL-MCNC: 0.4 MG/DL (ref 0.1–1)
BUN SERPL-MCNC: 7 MG/DL (ref 6–20)
CALCIUM SERPL-MCNC: 9.8 MG/DL (ref 8.7–10.5)
CHLORIDE SERPL-SCNC: 103 MMOL/L (ref 95–110)
CO2 SERPL-SCNC: 30 MMOL/L (ref 23–29)
CREAT SERPL-MCNC: 0.8 MG/DL (ref 0.5–1.4)
DIFFERENTIAL METHOD: ABNORMAL
EOSINOPHIL # BLD AUTO: 0.2 K/UL (ref 0–0.5)
EOSINOPHIL NFR BLD: 1.9 % (ref 0–8)
ERYTHROCYTE [DISTWIDTH] IN BLOOD BY AUTOMATED COUNT: 15.3 % (ref 11.5–14.5)
EST. GFR  (AFRICAN AMERICAN): >60 ML/MIN/1.73 M^2
EST. GFR  (NON AFRICAN AMERICAN): >60 ML/MIN/1.73 M^2
ESTIMATED AVG GLUCOSE: 108 MG/DL (ref 68–131)
GLUCOSE SERPL-MCNC: 94 MG/DL (ref 70–110)
HBA1C MFR BLD: 5.4 % (ref 4–5.6)
HCT VFR BLD AUTO: 39.6 % (ref 37–48.5)
HGB BLD-MCNC: 13.1 G/DL (ref 12–16)
IMM GRANULOCYTES # BLD AUTO: 0.02 K/UL (ref 0–0.04)
IMM GRANULOCYTES NFR BLD AUTO: 0.2 % (ref 0–0.5)
IRON SERPL-MCNC: 72 UG/DL (ref 30–160)
LYMPHOCYTES # BLD AUTO: 1.4 K/UL (ref 1–4.8)
LYMPHOCYTES NFR BLD: 14.9 % (ref 18–48)
MCH RBC QN AUTO: 27.8 PG (ref 27–31)
MCHC RBC AUTO-ENTMCNC: 33.1 G/DL (ref 32–36)
MCV RBC AUTO: 84 FL (ref 82–98)
MONOCYTES # BLD AUTO: 0.5 K/UL (ref 0.3–1)
MONOCYTES NFR BLD: 5.4 % (ref 4–15)
NEUTROPHILS # BLD AUTO: 7.5 K/UL (ref 1.8–7.7)
NEUTROPHILS NFR BLD: 77.3 % (ref 38–73)
NRBC BLD-RTO: 0 /100 WBC
PLATELET # BLD AUTO: 331 K/UL (ref 150–450)
PMV BLD AUTO: 11.2 FL (ref 9.2–12.9)
POTASSIUM SERPL-SCNC: 3.6 MMOL/L (ref 3.5–5.1)
PREALB SERPL-MCNC: 25 MG/DL (ref 20–43)
PROT SERPL-MCNC: 7.1 G/DL (ref 6–8.4)
RBC # BLD AUTO: 4.71 M/UL (ref 4–5.4)
SATURATED IRON: 17 % (ref 20–50)
SODIUM SERPL-SCNC: 142 MMOL/L (ref 136–145)
T4 FREE SERPL-MCNC: 0.83 NG/DL (ref 0.71–1.51)
TOTAL IRON BINDING CAPACITY: 416 UG/DL (ref 250–450)
TRANSFERRIN SERPL-MCNC: 281 MG/DL (ref 200–375)
TSH SERPL DL<=0.005 MIU/L-ACNC: 1.06 UIU/ML (ref 0.4–4)
VIT B12 SERPL-MCNC: 258 PG/ML (ref 210–950)
WBC # BLD AUTO: 9.69 K/UL (ref 3.9–12.7)

## 2021-05-19 ENCOUNTER — HOSPITAL ENCOUNTER (OUTPATIENT)
Dept: RADIOLOGY | Facility: HOSPITAL | Age: 51
Discharge: HOME | End: 2021-05-19
Attending: SURGERY
Payer: OTHER GOVERNMENT

## 2021-05-19 ENCOUNTER — PATIENT MESSAGE (OUTPATIENT)
Dept: SURGERY | Facility: CLINIC | Age: 51
End: 2021-05-19

## 2021-05-19 DIAGNOSIS — M17.0 OSTEOARTHRITIS OF BOTH KNEES, UNSPECIFIED OSTEOARTHRITIS TYPE: ICD-10-CM

## 2021-05-19 DIAGNOSIS — G47.33 OSA ON CPAP: ICD-10-CM

## 2021-05-19 DIAGNOSIS — K21.9 GASTROESOPHAGEAL REFLUX DISEASE, UNSPECIFIED WHETHER ESOPHAGITIS PRESENT: ICD-10-CM

## 2021-05-19 PROCEDURE — 71046 X-RAY EXAM CHEST 2 VIEWS: CPT | Mod: TC

## 2021-05-19 PROCEDURE — 71046 XR CHEST PA AND LATERAL: ICD-10-PCS | Mod: 26,,, | Performed by: RADIOLOGY

## 2021-05-19 PROCEDURE — 71046 X-RAY EXAM CHEST 2 VIEWS: CPT | Mod: 26,,, | Performed by: RADIOLOGY

## 2021-05-21 ENCOUNTER — PATIENT MESSAGE (OUTPATIENT)
Dept: SURGERY | Facility: CLINIC | Age: 51
End: 2021-05-21

## 2021-05-24 LAB — VIT B1 BLD-MCNC: 52 UG/L (ref 38–122)

## 2021-05-25 ENCOUNTER — PATIENT MESSAGE (OUTPATIENT)
Dept: SURGERY | Facility: CLINIC | Age: 51
End: 2021-05-25

## 2021-05-29 ENCOUNTER — PATIENT OUTREACH (OUTPATIENT)
Dept: ADMINISTRATIVE | Facility: OTHER | Age: 51
End: 2021-05-29

## 2021-05-29 DIAGNOSIS — Z12.31 ENCOUNTER FOR SCREENING MAMMOGRAM FOR MALIGNANT NEOPLASM OF BREAST: Primary | ICD-10-CM

## 2021-05-31 ENCOUNTER — PATIENT MESSAGE (OUTPATIENT)
Dept: INTERNAL MEDICINE | Facility: CLINIC | Age: 51
End: 2021-05-31

## 2021-05-31 ENCOUNTER — OFFICE VISIT (OUTPATIENT)
Dept: SURGERY | Facility: CLINIC | Age: 51
End: 2021-05-31
Payer: OTHER GOVERNMENT

## 2021-05-31 VITALS
HEIGHT: 67 IN | HEART RATE: 97 BPM | WEIGHT: 235.88 LBS | TEMPERATURE: 97 F | DIASTOLIC BLOOD PRESSURE: 76 MMHG | SYSTOLIC BLOOD PRESSURE: 126 MMHG | BODY MASS INDEX: 37.02 KG/M2

## 2021-05-31 DIAGNOSIS — Z01.818 PRE-OP TESTING: ICD-10-CM

## 2021-05-31 DIAGNOSIS — G47.33 OSA ON CPAP: Primary | ICD-10-CM

## 2021-05-31 DIAGNOSIS — E66.01 MORBID OBESITY: ICD-10-CM

## 2021-05-31 DIAGNOSIS — M17.0 OSTEOARTHRITIS OF BOTH KNEES, UNSPECIFIED OSTEOARTHRITIS TYPE: ICD-10-CM

## 2021-05-31 DIAGNOSIS — K21.9 GASTROESOPHAGEAL REFLUX DISEASE, UNSPECIFIED WHETHER ESOPHAGITIS PRESENT: ICD-10-CM

## 2021-05-31 PROCEDURE — 99215 OFFICE O/P EST HI 40 MIN: CPT | Mod: PBBFAC | Performed by: SURGERY

## 2021-05-31 PROCEDURE — 99999 PR PBB SHADOW E&M-EST. PATIENT-LVL V: ICD-10-PCS | Mod: PBBFAC,,, | Performed by: SURGERY

## 2021-05-31 PROCEDURE — 99214 OFFICE O/P EST MOD 30 MIN: CPT | Mod: S$PBB,CS,, | Performed by: SURGERY

## 2021-05-31 PROCEDURE — 99214 PR OFFICE/OUTPT VISIT, EST, LEVL IV, 30-39 MIN: ICD-10-PCS | Mod: S$PBB,CS,, | Performed by: SURGERY

## 2021-05-31 PROCEDURE — 99999 PR PBB SHADOW E&M-EST. PATIENT-LVL V: CPT | Mod: PBBFAC,,, | Performed by: SURGERY

## 2021-05-31 RX ORDER — LIDOCAINE HYDROCHLORIDE 10 MG/ML
1 INJECTION, SOLUTION EPIDURAL; INFILTRATION; INTRACAUDAL; PERINEURAL ONCE
Status: CANCELLED | OUTPATIENT
Start: 2021-05-31 | End: 2021-05-31

## 2021-05-31 RX ORDER — SODIUM CHLORIDE 9 MG/ML
INJECTION, SOLUTION INTRAVENOUS CONTINUOUS
Status: CANCELLED | OUTPATIENT
Start: 2021-05-31

## 2021-05-31 RX ORDER — PREGABALIN 100 MG/1
100 CAPSULE ORAL DAILY
COMMUNITY
End: 2021-05-31

## 2021-06-03 RX ORDER — PREGABALIN 150 MG/1
150 CAPSULE ORAL 2 TIMES DAILY
COMMUNITY

## 2021-06-03 RX ORDER — LAMOTRIGINE 50 MG/1
1 TABLET, EXTENDED RELEASE ORAL NIGHTLY
COMMUNITY
End: 2021-11-03

## 2021-06-03 RX ORDER — CYANOCOBALAMIN 1000 UG/ML
1000 INJECTION, SOLUTION INTRAMUSCULAR; SUBCUTANEOUS
COMMUNITY

## 2021-06-10 ENCOUNTER — ANESTHESIA (OUTPATIENT)
Dept: SURGERY | Facility: HOSPITAL | Age: 51
End: 2021-06-10
Payer: OTHER GOVERNMENT

## 2021-06-10 ENCOUNTER — ANESTHESIA EVENT (OUTPATIENT)
Dept: SURGERY | Facility: HOSPITAL | Age: 51
End: 2021-06-10
Payer: OTHER GOVERNMENT

## 2021-06-10 ENCOUNTER — HOSPITAL ENCOUNTER (OUTPATIENT)
Facility: HOSPITAL | Age: 51
Discharge: HOME | End: 2021-06-11
Attending: SURGERY | Admitting: SURGERY
Payer: OTHER GOVERNMENT

## 2021-06-10 DIAGNOSIS — M17.0 OSTEOARTHRITIS OF BOTH KNEES, UNSPECIFIED OSTEOARTHRITIS TYPE: ICD-10-CM

## 2021-06-10 DIAGNOSIS — E66.01 MORBID OBESITY: ICD-10-CM

## 2021-06-10 DIAGNOSIS — G47.33 OSA ON CPAP: ICD-10-CM

## 2021-06-10 DIAGNOSIS — K21.9 GASTROESOPHAGEAL REFLUX DISEASE, UNSPECIFIED WHETHER ESOPHAGITIS PRESENT: ICD-10-CM

## 2021-06-10 LAB — POCT GLUCOSE: 135 MG/DL (ref 70–110)

## 2021-06-10 PROCEDURE — 25000003 PHARM REV CODE 250: Performed by: NURSE ANESTHETIST, CERTIFIED REGISTERED

## 2021-06-10 PROCEDURE — 27201423 OPTIME MED/SURG SUP & DEVICES STERILE SUPPLY: Performed by: SURGERY

## 2021-06-10 PROCEDURE — 36000713 HC OR TIME LEV V EA ADD 15 MIN: Performed by: SURGERY

## 2021-06-10 PROCEDURE — 43775 LAP SLEEVE GASTRECTOMY: CPT | Mod: ,,, | Performed by: SURGERY

## 2021-06-10 PROCEDURE — 00797 ANES IPER UPR ABD GSTR PX MO: CPT | Performed by: SURGERY

## 2021-06-10 PROCEDURE — 88307 TISSUE EXAM BY PATHOLOGIST: CPT | Performed by: PATHOLOGY

## 2021-06-10 PROCEDURE — 63600175 PHARM REV CODE 636 W HCPCS: Performed by: ANESTHESIOLOGY

## 2021-06-10 PROCEDURE — S0028 INJECTION, FAMOTIDINE, 20 MG: HCPCS | Performed by: SURGERY

## 2021-06-10 PROCEDURE — 88307 PR  SURG PATH,LEVEL V: ICD-10-PCS | Mod: 26,,, | Performed by: PATHOLOGY

## 2021-06-10 PROCEDURE — 25000003 PHARM REV CODE 250: Performed by: ANESTHESIOLOGY

## 2021-06-10 PROCEDURE — 63600175 PHARM REV CODE 636 W HCPCS: Performed by: SURGERY

## 2021-06-10 PROCEDURE — 63600175 PHARM REV CODE 636 W HCPCS: Performed by: NURSE ANESTHETIST, CERTIFIED REGISTERED

## 2021-06-10 PROCEDURE — 25000003 PHARM REV CODE 250: Performed by: SURGERY

## 2021-06-10 PROCEDURE — 36000712 HC OR TIME LEV V 1ST 15 MIN: Performed by: SURGERY

## 2021-06-10 PROCEDURE — 71000039 HC RECOVERY, EACH ADD'L HOUR: Performed by: SURGERY

## 2021-06-10 PROCEDURE — 71000033 HC RECOVERY, INTIAL HOUR: Performed by: SURGERY

## 2021-06-10 PROCEDURE — 37000009 HC ANESTHESIA EA ADD 15 MINS: Performed by: SURGERY

## 2021-06-10 PROCEDURE — 88307 TISSUE EXAM BY PATHOLOGIST: CPT | Mod: 26,,, | Performed by: PATHOLOGY

## 2021-06-10 PROCEDURE — 37000008 HC ANESTHESIA 1ST 15 MINUTES: Performed by: SURGERY

## 2021-06-10 PROCEDURE — 43775 PR LAP, GAST RESTRICT PROC, LONGITUDINAL GASTRECTOMY: ICD-10-PCS | Mod: ,,, | Performed by: SURGERY

## 2021-06-10 RX ORDER — HYDROMORPHONE HCL IN 0.9% NACL 6 MG/30 ML
PATIENT CONTROLLED ANALGESIA SYRINGE INTRAVENOUS CONTINUOUS
Status: DISCONTINUED | OUTPATIENT
Start: 2021-06-10 | End: 2021-06-11

## 2021-06-10 RX ORDER — HYDROMORPHONE HYDROCHLORIDE 2 MG/ML
0.2 INJECTION, SOLUTION INTRAMUSCULAR; INTRAVENOUS; SUBCUTANEOUS EVERY 5 MIN PRN
Status: DISCONTINUED | OUTPATIENT
Start: 2021-06-10 | End: 2021-06-10 | Stop reason: HOSPADM

## 2021-06-10 RX ORDER — BUPIVACAINE HYDROCHLORIDE 2.5 MG/ML
INJECTION, SOLUTION EPIDURAL; INFILTRATION; INTRACAUDAL
Status: DISCONTINUED | OUTPATIENT
Start: 2021-06-10 | End: 2021-06-10 | Stop reason: HOSPADM

## 2021-06-10 RX ORDER — ONDANSETRON 2 MG/ML
4 INJECTION INTRAMUSCULAR; INTRAVENOUS EVERY 8 HOURS PRN
Status: DISCONTINUED | OUTPATIENT
Start: 2021-06-10 | End: 2021-06-11 | Stop reason: HOSPADM

## 2021-06-10 RX ORDER — ROCURONIUM BROMIDE 10 MG/ML
INJECTION, SOLUTION INTRAVENOUS
Status: DISCONTINUED | OUTPATIENT
Start: 2021-06-10 | End: 2021-06-10

## 2021-06-10 RX ORDER — ONDANSETRON 2 MG/ML
4 INJECTION INTRAMUSCULAR; INTRAVENOUS DAILY PRN
Status: DISCONTINUED | OUTPATIENT
Start: 2021-06-10 | End: 2021-06-10 | Stop reason: HOSPADM

## 2021-06-10 RX ORDER — FAMOTIDINE 20 MG/50ML
20 INJECTION, SOLUTION INTRAVENOUS 2 TIMES DAILY
Status: DISCONTINUED | OUTPATIENT
Start: 2021-06-10 | End: 2021-06-11 | Stop reason: HOSPADM

## 2021-06-10 RX ORDER — GLUCAGON 1 MG
1 KIT INJECTION
Status: DISCONTINUED | OUTPATIENT
Start: 2021-06-10 | End: 2021-06-11 | Stop reason: HOSPADM

## 2021-06-10 RX ORDER — HEPARIN SODIUM 5000 [USP'U]/ML
5000 INJECTION, SOLUTION INTRAVENOUS; SUBCUTANEOUS EVERY 8 HOURS
Status: DISCONTINUED | OUTPATIENT
Start: 2021-06-10 | End: 2021-06-11 | Stop reason: HOSPADM

## 2021-06-10 RX ORDER — DIPHENHYDRAMINE HYDROCHLORIDE 50 MG/ML
25 INJECTION INTRAMUSCULAR; INTRAVENOUS EVERY 4 HOURS PRN
Status: DISCONTINUED | OUTPATIENT
Start: 2021-06-10 | End: 2021-06-11 | Stop reason: HOSPADM

## 2021-06-10 RX ORDER — FAMOTIDINE 10 MG/ML
20 INJECTION INTRAVENOUS EVERY 12 HOURS
Status: DISCONTINUED | OUTPATIENT
Start: 2021-06-10 | End: 2021-06-10

## 2021-06-10 RX ORDER — PHENYLEPHRINE HYDROCHLORIDE 10 MG/ML
INJECTION INTRAVENOUS
Status: DISCONTINUED | OUTPATIENT
Start: 2021-06-10 | End: 2021-06-10

## 2021-06-10 RX ORDER — SODIUM CHLORIDE 9 MG/ML
INJECTION, SOLUTION INTRAVENOUS CONTINUOUS
Status: DISCONTINUED | OUTPATIENT
Start: 2021-06-10 | End: 2021-06-10

## 2021-06-10 RX ORDER — MIDAZOLAM HYDROCHLORIDE 1 MG/ML
INJECTION INTRAMUSCULAR; INTRAVENOUS
Status: DISCONTINUED | OUTPATIENT
Start: 2021-06-10 | End: 2021-06-10

## 2021-06-10 RX ORDER — LIDOCAINE HYDROCHLORIDE 10 MG/ML
1 INJECTION, SOLUTION EPIDURAL; INFILTRATION; INTRACAUDAL; PERINEURAL ONCE
Status: DISCONTINUED | OUTPATIENT
Start: 2021-06-10 | End: 2021-06-10 | Stop reason: HOSPADM

## 2021-06-10 RX ORDER — CHLORHEXIDINE GLUCONATE ORAL RINSE 1.2 MG/ML
10 SOLUTION DENTAL 2 TIMES DAILY
Status: DISCONTINUED | OUTPATIENT
Start: 2021-06-10 | End: 2021-06-11 | Stop reason: HOSPADM

## 2021-06-10 RX ORDER — ACETAMINOPHEN 325 MG/1
650 TABLET ORAL EVERY 6 HOURS PRN
Status: DISCONTINUED | OUTPATIENT
Start: 2021-06-10 | End: 2021-06-11 | Stop reason: HOSPADM

## 2021-06-10 RX ORDER — ONDANSETRON 2 MG/ML
INJECTION INTRAMUSCULAR; INTRAVENOUS
Status: DISCONTINUED | OUTPATIENT
Start: 2021-06-10 | End: 2021-06-10

## 2021-06-10 RX ORDER — PROPOFOL 10 MG/ML
VIAL (ML) INTRAVENOUS
Status: DISCONTINUED | OUTPATIENT
Start: 2021-06-10 | End: 2021-06-10

## 2021-06-10 RX ORDER — SCOLOPAMINE TRANSDERMAL SYSTEM 1 MG/1
1 PATCH, EXTENDED RELEASE TRANSDERMAL
Status: DISCONTINUED | OUTPATIENT
Start: 2021-06-10 | End: 2021-06-11 | Stop reason: HOSPADM

## 2021-06-10 RX ORDER — NALOXONE HCL 0.4 MG/ML
0.02 VIAL (ML) INJECTION
Status: DISCONTINUED | OUTPATIENT
Start: 2021-06-10 | End: 2021-06-11 | Stop reason: HOSPADM

## 2021-06-10 RX ORDER — LIDOCAINE HYDROCHLORIDE 10 MG/ML
INJECTION, SOLUTION EPIDURAL; INFILTRATION; INTRACAUDAL; PERINEURAL
Status: DISCONTINUED | OUTPATIENT
Start: 2021-06-10 | End: 2021-06-10 | Stop reason: HOSPADM

## 2021-06-10 RX ORDER — SODIUM CHLORIDE, SODIUM LACTATE, POTASSIUM CHLORIDE, CALCIUM CHLORIDE 600; 310; 30; 20 MG/100ML; MG/100ML; MG/100ML; MG/100ML
INJECTION, SOLUTION INTRAVENOUS CONTINUOUS PRN
Status: DISCONTINUED | OUTPATIENT
Start: 2021-06-10 | End: 2021-06-10

## 2021-06-10 RX ORDER — CEFAZOLIN SODIUM 2 G/50ML
2 SOLUTION INTRAVENOUS
Status: COMPLETED | OUTPATIENT
Start: 2021-06-10 | End: 2021-06-10

## 2021-06-10 RX ORDER — INSULIN ASPART 100 [IU]/ML
0-5 INJECTION, SOLUTION INTRAVENOUS; SUBCUTANEOUS EVERY 6 HOURS PRN
Status: DISCONTINUED | OUTPATIENT
Start: 2021-06-10 | End: 2021-06-11 | Stop reason: HOSPADM

## 2021-06-10 RX ORDER — SODIUM CHLORIDE, SODIUM LACTATE, POTASSIUM CHLORIDE, CALCIUM CHLORIDE 600; 310; 30; 20 MG/100ML; MG/100ML; MG/100ML; MG/100ML
INJECTION, SOLUTION INTRAVENOUS CONTINUOUS
Status: DISCONTINUED | OUTPATIENT
Start: 2021-06-10 | End: 2021-06-11

## 2021-06-10 RX ORDER — NEOSTIGMINE METHYLSULFATE 1 MG/ML
INJECTION, SOLUTION INTRAVENOUS
Status: DISCONTINUED | OUTPATIENT
Start: 2021-06-10 | End: 2021-06-10

## 2021-06-10 RX ORDER — HYDRALAZINE HYDROCHLORIDE 20 MG/ML
10 INJECTION INTRAMUSCULAR; INTRAVENOUS EVERY 6 HOURS PRN
Status: DISCONTINUED | OUTPATIENT
Start: 2021-06-10 | End: 2021-06-11 | Stop reason: HOSPADM

## 2021-06-10 RX ORDER — KETOROLAC TROMETHAMINE 30 MG/ML
15 INJECTION, SOLUTION INTRAMUSCULAR; INTRAVENOUS EVERY 8 HOURS PRN
Status: DISCONTINUED | OUTPATIENT
Start: 2021-06-10 | End: 2021-06-10 | Stop reason: HOSPADM

## 2021-06-10 RX ORDER — LIDOCAINE HYDROCHLORIDE 10 MG/ML
INJECTION, SOLUTION EPIDURAL; INFILTRATION; INTRACAUDAL; PERINEURAL
Status: DISCONTINUED | OUTPATIENT
Start: 2021-06-10 | End: 2021-06-10

## 2021-06-10 RX ORDER — FENTANYL CITRATE 50 UG/ML
INJECTION, SOLUTION INTRAMUSCULAR; INTRAVENOUS
Status: DISCONTINUED | OUTPATIENT
Start: 2021-06-10 | End: 2021-06-10

## 2021-06-10 RX ORDER — DEXAMETHASONE SODIUM PHOSPHATE 4 MG/ML
INJECTION, SOLUTION INTRA-ARTICULAR; INTRALESIONAL; INTRAMUSCULAR; INTRAVENOUS; SOFT TISSUE
Status: DISCONTINUED | OUTPATIENT
Start: 2021-06-10 | End: 2021-06-10

## 2021-06-10 RX ADMIN — ROCURONIUM BROMIDE 50 MG: 10 INJECTION, SOLUTION INTRAVENOUS at 04:06

## 2021-06-10 RX ADMIN — ONDANSETRON 4 MG: 2 INJECTION INTRAMUSCULAR; INTRAVENOUS at 10:06

## 2021-06-10 RX ADMIN — ROCURONIUM BROMIDE 15 MG: 10 INJECTION, SOLUTION INTRAVENOUS at 05:06

## 2021-06-10 RX ADMIN — LIDOCAINE HYDROCHLORIDE 100 MG: 10 INJECTION, SOLUTION EPIDURAL; INFILTRATION; INTRACAUDAL; PERINEURAL at 04:06

## 2021-06-10 RX ADMIN — CEFAZOLIN SODIUM 2 G: 2 SOLUTION INTRAVENOUS at 04:06

## 2021-06-10 RX ADMIN — ONDANSETRON 4 MG: 2 INJECTION, SOLUTION INTRAMUSCULAR; INTRAVENOUS at 06:06

## 2021-06-10 RX ADMIN — HYDROMORPHONE HYDROCHLORIDE 0.2 MG: 2 INJECTION INTRAMUSCULAR; INTRAVENOUS; SUBCUTANEOUS at 07:06

## 2021-06-10 RX ADMIN — HEPARIN SODIUM 5000 UNITS: 5000 INJECTION INTRAVENOUS; SUBCUTANEOUS at 09:06

## 2021-06-10 RX ADMIN — SCOPOLAMINE 1 PATCH: 1 PATCH, EXTENDED RELEASE TRANSDERMAL at 04:06

## 2021-06-10 RX ADMIN — FENTANYL CITRATE 50 MCG: 50 INJECTION, SOLUTION INTRAMUSCULAR; INTRAVENOUS at 05:06

## 2021-06-10 RX ADMIN — DEXAMETHASONE SODIUM PHOSPHATE 8 MG: 4 INJECTION, SOLUTION INTRAMUSCULAR; INTRAVENOUS at 04:06

## 2021-06-10 RX ADMIN — PROPOFOL 50 MG: 10 INJECTION, EMULSION INTRAVENOUS at 06:06

## 2021-06-10 RX ADMIN — FENTANYL CITRATE 50 MCG: 50 INJECTION, SOLUTION INTRAMUSCULAR; INTRAVENOUS at 06:06

## 2021-06-10 RX ADMIN — SODIUM CHLORIDE, SODIUM LACTATE, POTASSIUM CHLORIDE, AND CALCIUM CHLORIDE: 600; 310; 30; 20 INJECTION, SOLUTION INTRAVENOUS at 04:06

## 2021-06-10 RX ADMIN — MIDAZOLAM HYDROCHLORIDE 2 MG: 1 INJECTION, SOLUTION INTRAMUSCULAR; INTRAVENOUS at 04:06

## 2021-06-10 RX ADMIN — FENTANYL CITRATE 100 MCG: 50 INJECTION, SOLUTION INTRAMUSCULAR; INTRAVENOUS at 04:06

## 2021-06-10 RX ADMIN — PROPOFOL 150 MG: 10 INJECTION, EMULSION INTRAVENOUS at 04:06

## 2021-06-10 RX ADMIN — GLYCOPYRROLATE 0.8 MG: 0.2 INJECTION, SOLUTION INTRAMUSCULAR; INTRAVENOUS at 06:06

## 2021-06-10 RX ADMIN — 0.12% CHLORHEXIDINE GLUCONATE 10 ML: 1.2 RINSE ORAL at 09:06

## 2021-06-10 RX ADMIN — SODIUM CHLORIDE, SODIUM LACTATE, POTASSIUM CHLORIDE, AND CALCIUM CHLORIDE: .6; .31; .03; .02 INJECTION, SOLUTION INTRAVENOUS at 09:06

## 2021-06-10 RX ADMIN — FAMOTIDINE 20 MG: 20 INJECTION, SOLUTION INTRAVENOUS at 09:06

## 2021-06-10 RX ADMIN — NEOSTIGMINE METHYLSULFATE 5 MG: 1 INJECTION INTRAVENOUS at 06:06

## 2021-06-10 RX ADMIN — PHENYLEPHRINE HYDROCHLORIDE 100 MCG: 10 INJECTION INTRAVENOUS at 05:06

## 2021-06-10 RX ADMIN — ROCURONIUM BROMIDE 10 MG: 10 INJECTION, SOLUTION INTRAVENOUS at 05:06

## 2021-06-10 RX ADMIN — SODIUM CHLORIDE, SODIUM LACTATE, POTASSIUM CHLORIDE, AND CALCIUM CHLORIDE: 600; 310; 30; 20 INJECTION, SOLUTION INTRAVENOUS at 06:06

## 2021-06-10 RX ADMIN — SODIUM CHLORIDE, SODIUM LACTATE, POTASSIUM CHLORIDE, AND CALCIUM CHLORIDE: 600; 310; 30; 20 INJECTION, SOLUTION INTRAVENOUS at 05:06

## 2021-06-10 RX ADMIN — LIDOCAINE HYDROCHLORIDE 50 MG: 10 INJECTION, SOLUTION EPIDURAL; INFILTRATION; INTRACAUDAL; PERINEURAL at 06:06

## 2021-06-10 RX ADMIN — Medication: at 07:06

## 2021-06-11 VITALS
HEIGHT: 67 IN | DIASTOLIC BLOOD PRESSURE: 56 MMHG | WEIGHT: 240.31 LBS | HEART RATE: 72 BPM | SYSTOLIC BLOOD PRESSURE: 109 MMHG | RESPIRATION RATE: 18 BRPM | BODY MASS INDEX: 37.72 KG/M2 | OXYGEN SATURATION: 95 % | TEMPERATURE: 97 F

## 2021-06-11 LAB
ANION GAP SERPL CALC-SCNC: 13 MMOL/L (ref 8–16)
BUN SERPL-MCNC: 10 MG/DL (ref 6–20)
CALCIUM SERPL-MCNC: 9 MG/DL (ref 8.7–10.5)
CHLORIDE SERPL-SCNC: 103 MMOL/L (ref 95–110)
CO2 SERPL-SCNC: 23 MMOL/L (ref 23–29)
CREAT SERPL-MCNC: 0.8 MG/DL (ref 0.5–1.4)
ERYTHROCYTE [DISTWIDTH] IN BLOOD BY AUTOMATED COUNT: 15.4 % (ref 11.5–14.5)
EST. GFR  (AFRICAN AMERICAN): >60 ML/MIN/1.73 M^2
EST. GFR  (NON AFRICAN AMERICAN): >60 ML/MIN/1.73 M^2
GLUCOSE SERPL-MCNC: 93 MG/DL (ref 70–110)
HCT VFR BLD AUTO: 39.7 % (ref 37–48.5)
HGB BLD-MCNC: 12.6 G/DL (ref 12–16)
MCH RBC QN AUTO: 27.2 PG (ref 27–31)
MCHC RBC AUTO-ENTMCNC: 31.7 G/DL (ref 32–36)
MCV RBC AUTO: 86 FL (ref 82–98)
PLATELET # BLD AUTO: 327 K/UL (ref 150–450)
PMV BLD AUTO: 10 FL (ref 9.2–12.9)
POCT GLUCOSE: 110 MG/DL (ref 70–110)
POCT GLUCOSE: 113 MG/DL (ref 70–110)
POTASSIUM SERPL-SCNC: 4.1 MMOL/L (ref 3.5–5.1)
RBC # BLD AUTO: 4.63 M/UL (ref 4–5.4)
SODIUM SERPL-SCNC: 139 MMOL/L (ref 136–145)
WBC # BLD AUTO: 13.68 K/UL (ref 3.9–12.7)

## 2021-06-11 PROCEDURE — 94761 N-INVAS EAR/PLS OXIMETRY MLT: CPT

## 2021-06-11 PROCEDURE — 80048 BASIC METABOLIC PNL TOTAL CA: CPT | Performed by: SURGERY

## 2021-06-11 PROCEDURE — 36415 COLL VENOUS BLD VENIPUNCTURE: CPT | Performed by: SURGERY

## 2021-06-11 PROCEDURE — 94799 UNLISTED PULMONARY SVC/PX: CPT

## 2021-06-11 PROCEDURE — 97530 THERAPEUTIC ACTIVITIES: CPT

## 2021-06-11 PROCEDURE — 97161 PT EVAL LOW COMPLEX 20 MIN: CPT

## 2021-06-11 PROCEDURE — 85027 COMPLETE CBC AUTOMATED: CPT | Performed by: SURGERY

## 2021-06-11 PROCEDURE — 63600175 PHARM REV CODE 636 W HCPCS: Performed by: SURGERY

## 2021-06-11 PROCEDURE — 96372 THER/PROPH/DIAG INJ SC/IM: CPT

## 2021-06-11 PROCEDURE — S0028 INJECTION, FAMOTIDINE, 20 MG: HCPCS | Performed by: SURGERY

## 2021-06-11 PROCEDURE — 99900035 HC TECH TIME PER 15 MIN (STAT)

## 2021-06-11 PROCEDURE — 25000003 PHARM REV CODE 250: Performed by: SURGERY

## 2021-06-11 RX ORDER — PANTOPRAZOLE SODIUM 40 MG/1
40 TABLET, DELAYED RELEASE ORAL DAILY
Qty: 30 TABLET | Refills: 11 | Status: SHIPPED | OUTPATIENT
Start: 2021-06-11 | End: 2022-06-11

## 2021-06-11 RX ORDER — ALBUTEROL SULFATE 90 UG/1
2 AEROSOL, METERED RESPIRATORY (INHALATION) EVERY 6 HOURS PRN
Status: DISCONTINUED | OUTPATIENT
Start: 2021-06-11 | End: 2021-06-11

## 2021-06-11 RX ORDER — BUTALBITAL, ACETAMINOPHEN AND CAFFEINE 50; 325; 40 MG/1; MG/1; MG/1
1 TABLET ORAL ONCE
Status: COMPLETED | OUTPATIENT
Start: 2021-06-11 | End: 2021-06-11

## 2021-06-11 RX ORDER — ALBUTEROL SULFATE 0.83 MG/ML
2.5 SOLUTION RESPIRATORY (INHALATION) EVERY 6 HOURS PRN
Status: DISCONTINUED | OUTPATIENT
Start: 2021-06-11 | End: 2021-06-11 | Stop reason: HOSPADM

## 2021-06-11 RX ORDER — BUPROPION HYDROCHLORIDE 150 MG/1
450 TABLET ORAL NIGHTLY
Status: DISCONTINUED | OUTPATIENT
Start: 2021-06-11 | End: 2021-06-11 | Stop reason: HOSPADM

## 2021-06-11 RX ORDER — HYDROCODONE BITARTRATE AND ACETAMINOPHEN 10; 325 MG/1; MG/1
1 TABLET ORAL EVERY 4 HOURS PRN
Status: DISCONTINUED | OUTPATIENT
Start: 2021-06-11 | End: 2021-06-11 | Stop reason: HOSPADM

## 2021-06-11 RX ORDER — MONTELUKAST SODIUM 10 MG/1
10 TABLET ORAL NIGHTLY
Status: DISCONTINUED | OUTPATIENT
Start: 2021-06-11 | End: 2021-06-11 | Stop reason: HOSPADM

## 2021-06-11 RX ORDER — ONDANSETRON 8 MG/1
8 TABLET, ORALLY DISINTEGRATING ORAL EVERY 8 HOURS PRN
Qty: 10 TABLET | Refills: 0 | Status: SHIPPED | OUTPATIENT
Start: 2021-06-11 | End: 2022-03-09

## 2021-06-11 RX ORDER — HYDROCODONE BITARTRATE AND ACETAMINOPHEN 10; 325 MG/1; MG/1
1 TABLET ORAL EVERY 4 HOURS PRN
Qty: 20 TABLET | Refills: 0 | Status: SHIPPED | OUTPATIENT
Start: 2021-06-11 | End: 2021-06-28

## 2021-06-11 RX ORDER — HYDROMORPHONE HYDROCHLORIDE 1 MG/ML
1 INJECTION, SOLUTION INTRAMUSCULAR; INTRAVENOUS; SUBCUTANEOUS
Status: DISCONTINUED | OUTPATIENT
Start: 2021-06-11 | End: 2021-06-11 | Stop reason: HOSPADM

## 2021-06-11 RX ORDER — ESCITALOPRAM OXALATE 10 MG/1
10 TABLET ORAL NIGHTLY
Status: DISCONTINUED | OUTPATIENT
Start: 2021-06-11 | End: 2021-06-11 | Stop reason: HOSPADM

## 2021-06-11 RX ORDER — PROMETHAZINE HYDROCHLORIDE 12.5 MG/1
12.5 TABLET ORAL EVERY 6 HOURS PRN
Qty: 20 TABLET | Refills: 0 | Status: SHIPPED | OUTPATIENT
Start: 2021-06-11 | End: 2022-03-09

## 2021-06-11 RX ADMIN — BUTALBITAL, ACETAMINOPHEN, AND CAFFEINE 1 TABLET: 50; 325; 40 TABLET ORAL at 02:06

## 2021-06-11 RX ADMIN — HEPARIN SODIUM 5000 UNITS: 5000 INJECTION INTRAVENOUS; SUBCUTANEOUS at 02:06

## 2021-06-11 RX ADMIN — 0.12% CHLORHEXIDINE GLUCONATE 10 ML: 1.2 RINSE ORAL at 09:06

## 2021-06-11 RX ADMIN — HYDROCODONE BITARTRATE AND ACETAMINOPHEN 1 TABLET: 10; 325 TABLET ORAL at 03:06

## 2021-06-11 RX ADMIN — SODIUM CHLORIDE, SODIUM LACTATE, POTASSIUM CHLORIDE, AND CALCIUM CHLORIDE: .6; .31; .03; .02 INJECTION, SOLUTION INTRAVENOUS at 02:06

## 2021-06-11 RX ADMIN — HEPARIN SODIUM 5000 UNITS: 5000 INJECTION INTRAVENOUS; SUBCUTANEOUS at 05:06

## 2021-06-11 RX ADMIN — FAMOTIDINE 20 MG: 20 INJECTION, SOLUTION INTRAVENOUS at 09:06

## 2021-06-16 ENCOUNTER — PATIENT MESSAGE (OUTPATIENT)
Dept: SURGERY | Facility: HOSPITAL | Age: 51
End: 2021-06-16

## 2021-06-16 LAB
FINAL PATHOLOGIC DIAGNOSIS: NORMAL
GROSS: NORMAL
Lab: NORMAL

## 2021-06-23 ENCOUNTER — OFFICE VISIT (OUTPATIENT)
Dept: SURGERY | Facility: CLINIC | Age: 51
End: 2021-06-23
Payer: OTHER GOVERNMENT

## 2021-06-23 ENCOUNTER — CLINICAL SUPPORT (OUTPATIENT)
Dept: INTERNAL MEDICINE | Facility: CLINIC | Age: 51
End: 2021-06-23
Payer: OTHER GOVERNMENT

## 2021-06-23 VITALS
HEART RATE: 89 BPM | DIASTOLIC BLOOD PRESSURE: 76 MMHG | HEIGHT: 67 IN | TEMPERATURE: 99 F | SYSTOLIC BLOOD PRESSURE: 120 MMHG | WEIGHT: 226 LBS | BODY MASS INDEX: 35.47 KG/M2

## 2021-06-23 DIAGNOSIS — Z98.84 STATUS POST BARIATRIC SURGERY: ICD-10-CM

## 2021-06-23 DIAGNOSIS — M17.0 OSTEOARTHRITIS OF BOTH KNEES, UNSPECIFIED OSTEOARTHRITIS TYPE: ICD-10-CM

## 2021-06-23 DIAGNOSIS — G47.33 OSA ON CPAP: ICD-10-CM

## 2021-06-23 DIAGNOSIS — K21.9 GASTROESOPHAGEAL REFLUX DISEASE, UNSPECIFIED WHETHER ESOPHAGITIS PRESENT: ICD-10-CM

## 2021-06-23 DIAGNOSIS — Z71.3 DIETARY COUNSELING: ICD-10-CM

## 2021-06-23 DIAGNOSIS — E66.01 MORBID OBESITY: Primary | ICD-10-CM

## 2021-06-23 PROCEDURE — 99999 PR PBB SHADOW E&M-EST. PATIENT-LVL III: CPT | Mod: PBBFAC,,, | Performed by: SURGERY

## 2021-06-23 PROCEDURE — 97802 MEDICAL NUTRITION INDIV IN: CPT | Mod: 95,,, | Performed by: DIETITIAN, REGISTERED

## 2021-06-23 PROCEDURE — 97802 PR MED NUTR THER, 1ST, INDIV, EA 15 MIN: ICD-10-PCS | Mod: 95,,, | Performed by: DIETITIAN, REGISTERED

## 2021-06-23 PROCEDURE — 99213 OFFICE O/P EST LOW 20 MIN: CPT | Mod: PBBFAC | Performed by: SURGERY

## 2021-06-23 PROCEDURE — 99024 PR POST-OP FOLLOW-UP VISIT: ICD-10-PCS | Mod: ,,, | Performed by: SURGERY

## 2021-06-23 PROCEDURE — 99024 POSTOP FOLLOW-UP VISIT: CPT | Mod: ,,, | Performed by: SURGERY

## 2021-06-23 PROCEDURE — 99999 PR PBB SHADOW E&M-EST. PATIENT-LVL III: ICD-10-PCS | Mod: PBBFAC,,, | Performed by: SURGERY

## 2021-06-28 ENCOUNTER — OFFICE VISIT (OUTPATIENT)
Dept: INTERNAL MEDICINE | Facility: CLINIC | Age: 51
End: 2021-06-28
Payer: OTHER GOVERNMENT

## 2021-06-28 VITALS
TEMPERATURE: 98 F | OXYGEN SATURATION: 98 % | WEIGHT: 225.31 LBS | SYSTOLIC BLOOD PRESSURE: 116 MMHG | BODY MASS INDEX: 35.29 KG/M2 | HEART RATE: 86 BPM | DIASTOLIC BLOOD PRESSURE: 78 MMHG

## 2021-06-28 DIAGNOSIS — Z91.09 ENVIRONMENTAL ALLERGIES: ICD-10-CM

## 2021-06-28 DIAGNOSIS — G47.33 OSA ON CPAP: ICD-10-CM

## 2021-06-28 DIAGNOSIS — E66.01 SEVERE OBESITY (BMI 35.0-39.9) WITH COMORBIDITY: ICD-10-CM

## 2021-06-28 DIAGNOSIS — F31.32 BIPOLAR DISORDER WITH MODERATE DEPRESSION: ICD-10-CM

## 2021-06-28 DIAGNOSIS — F43.10 PTSD (POST-TRAUMATIC STRESS DISORDER): ICD-10-CM

## 2021-06-28 DIAGNOSIS — J01.00 ACUTE NON-RECURRENT MAXILLARY SINUSITIS: Primary | ICD-10-CM

## 2021-06-28 PROCEDURE — 99999 PR PBB SHADOW E&M-EST. PATIENT-LVL V: ICD-10-PCS | Mod: PBBFAC,,, | Performed by: FAMILY MEDICINE

## 2021-06-28 PROCEDURE — 99215 OFFICE O/P EST HI 40 MIN: CPT | Mod: PBBFAC | Performed by: FAMILY MEDICINE

## 2021-06-28 PROCEDURE — 99999 PR PBB SHADOW E&M-EST. PATIENT-LVL V: CPT | Mod: PBBFAC,,, | Performed by: FAMILY MEDICINE

## 2021-06-28 PROCEDURE — 99214 OFFICE O/P EST MOD 30 MIN: CPT | Mod: 25,S$GLB,, | Performed by: FAMILY MEDICINE

## 2021-06-28 PROCEDURE — 99214 PR OFFICE/OUTPT VISIT, EST, LEVL IV, 30-39 MIN: ICD-10-PCS | Mod: 25,S$GLB,, | Performed by: FAMILY MEDICINE

## 2021-06-28 PROCEDURE — 96372 THER/PROPH/DIAG INJ SC/IM: CPT | Mod: PBBFAC

## 2021-06-28 RX ORDER — METHYLPREDNISOLONE ACETATE 80 MG/ML
80 INJECTION, SUSPENSION INTRA-ARTICULAR; INTRALESIONAL; INTRAMUSCULAR; SOFT TISSUE
Status: COMPLETED | OUTPATIENT
Start: 2021-06-28 | End: 2021-06-28

## 2021-06-28 RX ORDER — AMOXICILLIN 875 MG/1
875 TABLET, FILM COATED ORAL 2 TIMES DAILY
Qty: 14 TABLET | Refills: 0 | Status: SHIPPED | OUTPATIENT
Start: 2021-06-28 | End: 2021-07-05

## 2021-06-28 RX ADMIN — METHYLPREDNISOLONE ACETATE 80 MG: 80 INJECTION, SUSPENSION INTRA-ARTICULAR; INTRALESIONAL; INTRAMUSCULAR; SOFT TISSUE at 05:06

## 2021-07-15 ENCOUNTER — PATIENT MESSAGE (OUTPATIENT)
Dept: ADMINISTRATIVE | Facility: HOSPITAL | Age: 51
End: 2021-07-15

## 2021-08-04 ENCOUNTER — PATIENT MESSAGE (OUTPATIENT)
Dept: ADMINISTRATIVE | Facility: OTHER | Age: 51
End: 2021-08-04

## 2021-08-13 ENCOUNTER — PATIENT MESSAGE (OUTPATIENT)
Dept: ADMINISTRATIVE | Facility: HOSPITAL | Age: 51
End: 2021-08-13

## 2021-08-21 ENCOUNTER — PATIENT MESSAGE (OUTPATIENT)
Dept: INTERNAL MEDICINE | Facility: CLINIC | Age: 51
End: 2021-08-21

## 2021-08-23 DIAGNOSIS — J45.40 MODERATE PERSISTENT ASTHMA WITHOUT COMPLICATION: ICD-10-CM

## 2021-08-23 DIAGNOSIS — Z91.09 ENVIRONMENTAL ALLERGIES: Primary | ICD-10-CM

## 2021-08-23 RX ORDER — BUDESONIDE AND FORMOTEROL FUMARATE DIHYDRATE 160; 4.5 UG/1; UG/1
2 AEROSOL RESPIRATORY (INHALATION) 2 TIMES DAILY
Qty: 10.2 G | Refills: 3 | Status: SHIPPED | OUTPATIENT
Start: 2021-08-23 | End: 2022-08-23

## 2021-08-26 ENCOUNTER — PATIENT MESSAGE (OUTPATIENT)
Dept: ADMINISTRATIVE | Facility: OTHER | Age: 51
End: 2021-08-26

## 2021-09-07 ENCOUNTER — PATIENT MESSAGE (OUTPATIENT)
Dept: ADMINISTRATIVE | Facility: OTHER | Age: 51
End: 2021-09-07

## 2021-09-09 ENCOUNTER — PATIENT MESSAGE (OUTPATIENT)
Dept: INTERNAL MEDICINE | Facility: CLINIC | Age: 51
End: 2021-09-09

## 2021-09-09 ENCOUNTER — PATIENT MESSAGE (OUTPATIENT)
Dept: ADMINISTRATIVE | Facility: OTHER | Age: 51
End: 2021-09-09

## 2021-09-10 DIAGNOSIS — Z86.19 HISTORY OF HERPES GENITALIS: Primary | ICD-10-CM

## 2021-09-10 RX ORDER — VALACYCLOVIR HYDROCHLORIDE 1 G/1
TABLET, FILM COATED ORAL
Qty: 30 TABLET | Refills: 0 | Status: SHIPPED | OUTPATIENT
Start: 2021-09-10 | End: 2022-05-27

## 2021-10-13 ENCOUNTER — TELEPHONE (OUTPATIENT)
Dept: INTERNAL MEDICINE | Facility: CLINIC | Age: 51
End: 2021-10-13

## 2021-10-13 DIAGNOSIS — Z01.818 PRE-OPERATIVE CLEARANCE: Primary | ICD-10-CM

## 2021-10-13 DIAGNOSIS — M17.0 PRIMARY OSTEOARTHRITIS OF BOTH KNEES: ICD-10-CM

## 2021-10-18 ENCOUNTER — TELEPHONE (OUTPATIENT)
Dept: CARDIOLOGY | Facility: HOSPITAL | Age: 51
End: 2021-10-18

## 2021-10-18 ENCOUNTER — TELEPHONE (OUTPATIENT)
Dept: SURGERY | Facility: CLINIC | Age: 51
End: 2021-10-18

## 2021-10-28 ENCOUNTER — HOSPITAL ENCOUNTER (OUTPATIENT)
Dept: CARDIOLOGY | Facility: HOSPITAL | Age: 51
Discharge: HOME | End: 2021-10-28
Payer: OTHER GOVERNMENT

## 2021-10-28 ENCOUNTER — HOSPITAL ENCOUNTER (OUTPATIENT)
Dept: RADIOLOGY | Facility: HOSPITAL | Age: 51
Discharge: HOME | End: 2021-10-28
Attending: FAMILY MEDICINE
Payer: OTHER GOVERNMENT

## 2021-10-28 DIAGNOSIS — Z01.818 PRE-OPERATIVE CLEARANCE: ICD-10-CM

## 2021-10-28 DIAGNOSIS — M17.0 PRIMARY OSTEOARTHRITIS OF BOTH KNEES: ICD-10-CM

## 2021-10-28 PROCEDURE — 71046 XR CHEST PA AND LATERAL: ICD-10-PCS | Mod: 26,,, | Performed by: RADIOLOGY

## 2021-10-28 PROCEDURE — 71046 X-RAY EXAM CHEST 2 VIEWS: CPT | Mod: 26,,, | Performed by: RADIOLOGY

## 2021-10-28 PROCEDURE — 93010 EKG 12-LEAD: ICD-10-PCS | Mod: ,,, | Performed by: INTERNAL MEDICINE

## 2021-10-28 PROCEDURE — 93010 ELECTROCARDIOGRAM REPORT: CPT | Mod: ,,, | Performed by: INTERNAL MEDICINE

## 2021-10-28 PROCEDURE — 93005 ELECTROCARDIOGRAM TRACING: CPT

## 2021-10-28 PROCEDURE — 71046 X-RAY EXAM CHEST 2 VIEWS: CPT | Mod: TC

## 2021-11-03 ENCOUNTER — OFFICE VISIT (OUTPATIENT)
Dept: INTERNAL MEDICINE | Facility: CLINIC | Age: 51
End: 2021-11-03
Payer: OTHER GOVERNMENT

## 2021-11-03 VITALS
HEART RATE: 84 BPM | BODY MASS INDEX: 32.94 KG/M2 | DIASTOLIC BLOOD PRESSURE: 78 MMHG | OXYGEN SATURATION: 97 % | WEIGHT: 210.31 LBS | SYSTOLIC BLOOD PRESSURE: 124 MMHG

## 2021-11-03 DIAGNOSIS — G47.33 OSA ON CPAP: ICD-10-CM

## 2021-11-03 DIAGNOSIS — F43.10 PTSD (POST-TRAUMATIC STRESS DISORDER): ICD-10-CM

## 2021-11-03 DIAGNOSIS — G43.019 INTRACTABLE MIGRAINE WITHOUT AURA AND WITHOUT STATUS MIGRAINOSUS: ICD-10-CM

## 2021-11-03 DIAGNOSIS — M17.0 PRIMARY OSTEOARTHRITIS OF BOTH KNEES: Primary | ICD-10-CM

## 2021-11-03 DIAGNOSIS — G47.429 NARCOLEPSY DUE TO UNDERLYING CONDITION WITHOUT CATAPLEXY: ICD-10-CM

## 2021-11-03 DIAGNOSIS — F31.32 BIPOLAR DISORDER WITH MODERATE DEPRESSION: ICD-10-CM

## 2021-11-03 DIAGNOSIS — Z01.818 PRE-OPERATIVE CLEARANCE: ICD-10-CM

## 2021-11-03 DIAGNOSIS — Z91.09 ENVIRONMENTAL ALLERGIES: ICD-10-CM

## 2021-11-03 DIAGNOSIS — K21.9 GASTROESOPHAGEAL REFLUX DISEASE, UNSPECIFIED WHETHER ESOPHAGITIS PRESENT: ICD-10-CM

## 2021-11-03 DIAGNOSIS — K58.1 IRRITABLE BOWEL SYNDROME WITH CONSTIPATION: ICD-10-CM

## 2021-11-03 PROCEDURE — 99999 PR PBB SHADOW E&M-EST. PATIENT-LVL V: CPT | Mod: PBBFAC,,, | Performed by: FAMILY MEDICINE

## 2021-11-03 PROCEDURE — 99214 PR OFFICE/OUTPT VISIT, EST, LEVL IV, 30-39 MIN: ICD-10-PCS | Mod: S$PBB,,, | Performed by: FAMILY MEDICINE

## 2021-11-03 PROCEDURE — 99999 PR PBB SHADOW E&M-EST. PATIENT-LVL V: ICD-10-PCS | Mod: PBBFAC,,, | Performed by: FAMILY MEDICINE

## 2021-11-03 PROCEDURE — 99215 OFFICE O/P EST HI 40 MIN: CPT | Mod: PBBFAC | Performed by: FAMILY MEDICINE

## 2021-11-03 PROCEDURE — 99214 OFFICE O/P EST MOD 30 MIN: CPT | Mod: S$PBB,,, | Performed by: FAMILY MEDICINE

## 2021-11-03 RX ORDER — TRAZODONE HYDROCHLORIDE 50 MG/1
50 TABLET ORAL NIGHTLY
COMMUNITY

## 2021-11-03 RX ORDER — LAMOTRIGINE 100 MG/1
TABLET ORAL
COMMUNITY
Start: 2021-07-07 | End: 2022-03-09

## 2021-11-03 RX ORDER — LINACLOTIDE 290 UG/1
290 CAPSULE, GELATIN COATED ORAL DAILY
COMMUNITY
Start: 2021-10-13

## 2022-01-28 ENCOUNTER — PATIENT OUTREACH (OUTPATIENT)
Dept: ADMINISTRATIVE | Facility: OTHER | Age: 52
End: 2022-01-28
Payer: OTHER GOVERNMENT

## 2022-02-16 ENCOUNTER — PATIENT MESSAGE (OUTPATIENT)
Dept: INTERNAL MEDICINE | Facility: CLINIC | Age: 52
End: 2022-02-16
Payer: OTHER GOVERNMENT

## 2022-02-16 ENCOUNTER — PATIENT MESSAGE (OUTPATIENT)
Dept: ADMINISTRATIVE | Facility: OTHER | Age: 52
End: 2022-02-16
Payer: OTHER GOVERNMENT

## 2022-03-07 ENCOUNTER — LAB VISIT (OUTPATIENT)
Dept: PRIMARY CARE CLINIC | Facility: OTHER | Age: 52
End: 2022-03-07
Attending: INTERNAL MEDICINE
Payer: OTHER GOVERNMENT

## 2022-03-07 DIAGNOSIS — Z20.822 ENCOUNTER FOR LABORATORY TESTING FOR COVID-19 VIRUS: ICD-10-CM

## 2022-03-07 DIAGNOSIS — R50.9 FEVER, UNSPECIFIED FEVER CAUSE: Primary | ICD-10-CM

## 2022-03-07 DIAGNOSIS — R05.9 COUGH: ICD-10-CM

## 2022-03-07 PROCEDURE — U0003 INFECTIOUS AGENT DETECTION BY NUCLEIC ACID (DNA OR RNA); SEVERE ACUTE RESPIRATORY SYNDROME CORONAVIRUS 2 (SARS-COV-2) (CORONAVIRUS DISEASE [COVID-19]), AMPLIFIED PROBE TECHNIQUE, MAKING USE OF HIGH THROUGHPUT TECHNOLOGIES AS DESCRIBED BY CMS-2020-01-R: HCPCS | Performed by: INTERNAL MEDICINE

## 2022-03-08 LAB
SARS-COV-2 RNA RESP QL NAA+PROBE: NOT DETECTED
SARS-COV-2- CYCLE NUMBER: NORMAL

## 2022-03-09 ENCOUNTER — TELEPHONE (OUTPATIENT)
Dept: INTERNAL MEDICINE | Facility: CLINIC | Age: 52
End: 2022-03-09

## 2022-03-09 ENCOUNTER — OFFICE VISIT (OUTPATIENT)
Dept: INTERNAL MEDICINE | Facility: CLINIC | Age: 52
End: 2022-03-09
Payer: OTHER GOVERNMENT

## 2022-03-09 ENCOUNTER — HOSPITAL ENCOUNTER (OUTPATIENT)
Dept: RADIOLOGY | Facility: HOSPITAL | Age: 52
Discharge: HOME | End: 2022-03-09
Attending: FAMILY MEDICINE
Payer: OTHER GOVERNMENT

## 2022-03-09 VITALS
BODY MASS INDEX: 31.11 KG/M2 | RESPIRATION RATE: 20 BRPM | DIASTOLIC BLOOD PRESSURE: 80 MMHG | WEIGHT: 198.19 LBS | TEMPERATURE: 99 F | HEIGHT: 67 IN | HEART RATE: 86 BPM | SYSTOLIC BLOOD PRESSURE: 100 MMHG | OXYGEN SATURATION: 99 %

## 2022-03-09 DIAGNOSIS — F31.32 BIPOLAR DISORDER WITH MODERATE DEPRESSION: ICD-10-CM

## 2022-03-09 DIAGNOSIS — G43.019 INTRACTABLE MIGRAINE WITHOUT AURA AND WITHOUT STATUS MIGRAINOSUS: ICD-10-CM

## 2022-03-09 DIAGNOSIS — E66.9 OBESITY (BMI 30.0-34.9): ICD-10-CM

## 2022-03-09 DIAGNOSIS — J45.30 MILD PERSISTENT ASTHMA WITHOUT COMPLICATION: ICD-10-CM

## 2022-03-09 DIAGNOSIS — R05.9 COUGH: Primary | ICD-10-CM

## 2022-03-09 DIAGNOSIS — F43.10 PTSD (POST-TRAUMATIC STRESS DISORDER): ICD-10-CM

## 2022-03-09 DIAGNOSIS — G47.33 OSA ON CPAP: ICD-10-CM

## 2022-03-09 DIAGNOSIS — R50.9 FEVER AND CHILLS: ICD-10-CM

## 2022-03-09 DIAGNOSIS — R05.9 COUGH: ICD-10-CM

## 2022-03-09 PROBLEM — E66.01 SEVERE OBESITY (BMI 35.0-39.9) WITH COMORBIDITY: Status: RESOLVED | Noted: 2021-06-10 | Resolved: 2022-03-09

## 2022-03-09 LAB
CTP QC/QA: YES
FLUAV AG NPH QL: NEGATIVE
FLUBV AG NPH QL: NEGATIVE

## 2022-03-09 PROCEDURE — 99214 OFFICE O/P EST MOD 30 MIN: CPT | Mod: S$GLB,,, | Performed by: FAMILY MEDICINE

## 2022-03-09 PROCEDURE — 87804 INFLUENZA ASSAY W/OPTIC: CPT | Mod: QW,S$GLB,, | Performed by: FAMILY MEDICINE

## 2022-03-09 PROCEDURE — 71046 X-RAY EXAM CHEST 2 VIEWS: CPT | Mod: TC

## 2022-03-09 PROCEDURE — 99999 PR PBB SHADOW E&M-EST. PATIENT-LVL V: ICD-10-PCS | Mod: PBBFAC,,, | Performed by: FAMILY MEDICINE

## 2022-03-09 PROCEDURE — 99214 PR OFFICE/OUTPT VISIT, EST, LEVL IV, 30-39 MIN: ICD-10-PCS | Mod: S$GLB,,, | Performed by: FAMILY MEDICINE

## 2022-03-09 PROCEDURE — 87804 POCT INFLUENZA A/B: ICD-10-PCS | Mod: QW,S$GLB,, | Performed by: FAMILY MEDICINE

## 2022-03-09 PROCEDURE — 71046 X-RAY EXAM CHEST 2 VIEWS: CPT | Mod: 26,,, | Performed by: RADIOLOGY

## 2022-03-09 PROCEDURE — 99999 PR PBB SHADOW E&M-EST. PATIENT-LVL V: CPT | Mod: PBBFAC,,, | Performed by: FAMILY MEDICINE

## 2022-03-09 PROCEDURE — 71046 XR CHEST PA AND LATERAL: ICD-10-PCS | Mod: 26,,, | Performed by: RADIOLOGY

## 2022-03-09 RX ORDER — APIXABAN 2.5 MG/1
2.5 TABLET, FILM COATED ORAL 2 TIMES DAILY
COMMUNITY
Start: 2021-11-12 | End: 2022-03-09

## 2022-03-09 RX ORDER — OXYCODONE HYDROCHLORIDE AND ACETAMINOPHEN 5; 325 MG/1; MG/1
1-2 TABLET ORAL
COMMUNITY
Start: 2021-11-12 | End: 2022-03-09

## 2022-03-09 RX ORDER — DIVALPROEX SODIUM 250 MG/1
TABLET, FILM COATED, EXTENDED RELEASE ORAL
COMMUNITY
Start: 2022-02-25 | End: 2022-05-27

## 2022-03-09 RX ORDER — HYDROCODONE BITARTRATE AND ACETAMINOPHEN 5; 325 MG/1; MG/1
1 TABLET ORAL
COMMUNITY
Start: 2022-01-31 | End: 2022-03-09

## 2022-03-09 RX ORDER — CEFUROXIME AXETIL 250 MG/1
TABLET ORAL
COMMUNITY
Start: 2022-02-15 | End: 2022-05-27

## 2022-03-09 RX ORDER — GENTAMICIN SULFATE 3 MG/ML
SOLUTION/ DROPS OPHTHALMIC
COMMUNITY
Start: 2022-01-22 | End: 2022-05-27

## 2022-03-09 RX ORDER — CELECOXIB 100 MG/1
CAPSULE ORAL
COMMUNITY
End: 2022-03-09

## 2022-03-09 RX ORDER — PROMETHAZINE HYDROCHLORIDE AND DEXTROMETHORPHAN HYDROBROMIDE 6.25; 15 MG/5ML; MG/5ML
5 SYRUP ORAL NIGHTLY PRN
Qty: 118 ML | Refills: 0 | Status: SHIPPED | OUTPATIENT
Start: 2022-03-09 | End: 2022-03-19

## 2022-03-09 RX ORDER — METHYLPREDNISOLONE 4 MG/1
TABLET ORAL
Qty: 1 EACH | Refills: 0 | Status: SHIPPED | OUTPATIENT
Start: 2022-03-09 | End: 2022-05-27

## 2022-03-09 RX ORDER — ONDANSETRON HYDROCHLORIDE 8 MG/1
TABLET, FILM COATED ORAL
COMMUNITY
Start: 2021-11-12 | End: 2022-05-27

## 2022-03-09 RX ORDER — DOCUSATE SODIUM 100 MG/1
CAPSULE ORAL
COMMUNITY
Start: 2021-11-12

## 2022-03-09 NOTE — PROGRESS NOTES
Subjective:       Patient ID: Jolly Galeas is a 51 y.o. female.    Chief Complaint: Cough, Chest Pain, Generalized Body Aches, Headache, Hoarse, and Shortness of Breath    51-year-old  female patient with Patient with Patient Active Problem List:     Anxiety     Bipolar disorder with moderate depression     Environmental allergies     Family history of colon cancer     GERD (gastroesophageal reflux disease)     Irritable bowel syndrome with constipation     Lung nodule     Intractable migraine without aura and without status migrainosus     Narcolepsy     Primary osteoarthritis of both knees     PTSD (post-traumatic stress disorder)     SUNIL on CPAP     Mediastinal mass     for fever, chills , body aches, cough which has been productive since last Friday.   Patient has been having discomfort with breathing and has been having hoarseness, reported initially sore throat but has subsided.   Patient has been checked for COVID 2 days ago which was negative  Has been using her inhalers regularly secondary to underlying asthma  Has been taking over-the-counter cough medication with no relief  Reports migraine headaches have been worse lately and has been on Aimovig injections and ubervyly but has not been approved by her insurance, waiting for approval,  currently on Botox injections      Cough  This is a recurrent problem. The current episode started in the past 7 days. The problem has been rapidly worsening. The problem occurs every few minutes. The cough is productive of sputum. Associated symptoms include chills, a fever, headaches, myalgias, nasal congestion, shortness of breath, sweats and wheezing. Pertinent negatives include no chest pain, ear congestion, ear pain, heartburn, hemoptysis, postnasal drip, rash, rhinorrhea, sore throat or weight loss. Nothing aggravates the symptoms. Risk factors for lung disease include occupational exposure. She has tried OTC cough suppressant, a beta-agonist  "inhaler, ipratropium inhaler and rest for the symptoms. The treatment provided mild relief. Her past medical history is significant for asthma, bronchitis, COPD, environmental allergies and pneumonia. There is no history of bronchiectasis or emphysema.     Review of Systems   Constitutional: Positive for chills and fever. Negative for weight loss.   HENT: Negative for ear pain, postnasal drip, rhinorrhea and sore throat.    Respiratory: Positive for cough, shortness of breath and wheezing. Negative for hemoptysis.    Cardiovascular: Negative for chest pain.   Gastrointestinal: Negative for heartburn.   Musculoskeletal: Positive for myalgias.   Skin: Negative for rash.   Allergic/Immunologic: Positive for environmental allergies.   Neurological: Positive for headaches.         /80 (BP Location: Right arm, Patient Position: Sitting, BP Method: Medium (Manual))   Pulse 86   Temp 98.6 °F (37 °C) (Tympanic)   Resp 20   Ht 5' 7" (1.702 m)   Wt 89.9 kg (198 lb 3.1 oz)   SpO2 99%   BMI 31.04 kg/m²   Objective:      Physical Exam  Constitutional:       Appearance: She is well-developed.   HENT:      Head: Normocephalic and atraumatic.   Cardiovascular:      Rate and Rhythm: Normal rate and regular rhythm.      Heart sounds: Normal heart sounds. No murmur heard.  Pulmonary:      Effort: Pulmonary effort is normal. No respiratory distress.      Breath sounds: Normal breath sounds. No wheezing.   Abdominal:      General: Bowel sounds are normal.      Palpations: Abdomen is soft.      Tenderness: There is no abdominal tenderness.   Skin:     General: Skin is warm and dry.      Findings: No rash.   Neurological:      Mental Status: She is alert and oriented to person, place, and time.   Psychiatric:         Mood and Affect: Mood normal.           Assessment/Plan:   1. Cough  - POCT INFLUENZA A/B  - promethazine-dextromethorphan (PROMETHAZINE-DM) 6.25-15 mg/5 mL Syrp; Take 5 mLs by mouth nightly as needed (cough).  " Dispense: 118 mL; Refill: 0  - CBC Auto Differential; Future  - X-Ray Chest PA And Lateral; Future  2. Fever and chills  - POCT INFLUENZA A/B  - CBC Auto Differential; Future  - X-Ray Chest PA And Lateral; Future    Rapid flu has been negative  Will get chest x-ray and CBC and will treat if positive for acute infectious  Etiology  Promethazine DM prescribed today for symptomatic relief    3. Mild persistent asthma without complication  Stable on albuterol and Symbicort    4. Intractable migraine without aura and without status migrainosus  [facility] with [facility]    5. Bipolar disorder with moderate depression  7. PTSD (post-traumatic stress disorder)  Stable followed by Psychiatry    6. SUNIL on CPAP      8. Obesity (BMI 30.0-34.9)   Lifestyle modifications recommended to lose weight with BMI 31

## 2022-03-09 NOTE — TELEPHONE ENCOUNTER
No acute infection noted on the chest x-ray and labs look stable, Will send Medrol Dosepak to help with her symptoms

## 2022-03-27 ENCOUNTER — PATIENT MESSAGE (OUTPATIENT)
Dept: INTERNAL MEDICINE | Facility: CLINIC | Age: 52
End: 2022-03-27
Payer: OTHER GOVERNMENT

## 2022-03-27 DIAGNOSIS — Z12.31 ENCOUNTER FOR SCREENING MAMMOGRAM FOR MALIGNANT NEOPLASM OF BREAST: Primary | ICD-10-CM

## 2022-05-10 ENCOUNTER — PATIENT OUTREACH (OUTPATIENT)
Dept: ADMINISTRATIVE | Facility: OTHER | Age: 52
End: 2022-05-10
Payer: OTHER GOVERNMENT

## 2022-05-25 ENCOUNTER — LAB VISIT (OUTPATIENT)
Dept: LAB | Facility: HOSPITAL | Age: 52
End: 2022-05-25
Attending: NURSE PRACTITIONER
Payer: OTHER GOVERNMENT

## 2022-05-25 ENCOUNTER — PATIENT MESSAGE (OUTPATIENT)
Dept: NEUROLOGY | Facility: CLINIC | Age: 52
End: 2022-05-25
Payer: OTHER GOVERNMENT

## 2022-05-25 DIAGNOSIS — R41.3 MEMORY DEFICIT: Primary | ICD-10-CM

## 2022-05-25 DIAGNOSIS — R41.3 MEMORY DEFICIT: ICD-10-CM

## 2022-05-25 LAB
FOLATE SERPL-MCNC: 12.9 NG/ML (ref 4–24)
HCYS SERPL-SCNC: 7.7 UMOL/L (ref 4–15.5)
TSH SERPL DL<=0.005 MIU/L-ACNC: 0.81 UIU/ML (ref 0.4–4)
VIT B12 SERPL-MCNC: 576 PG/ML (ref 210–950)

## 2022-05-25 PROCEDURE — 36415 COLL VENOUS BLD VENIPUNCTURE: CPT | Performed by: NURSE PRACTITIONER

## 2022-05-25 PROCEDURE — 83921 ORGANIC ACID SINGLE QUANT: CPT | Performed by: NURSE PRACTITIONER

## 2022-05-25 PROCEDURE — 83519 RIA NONANTIBODY: CPT | Mod: 59 | Performed by: NURSE PRACTITIONER

## 2022-05-25 PROCEDURE — 84443 ASSAY THYROID STIM HORMONE: CPT | Performed by: NURSE PRACTITIONER

## 2022-05-25 PROCEDURE — 82607 VITAMIN B-12: CPT | Performed by: NURSE PRACTITIONER

## 2022-05-25 PROCEDURE — 83090 ASSAY OF HOMOCYSTEINE: CPT | Performed by: NURSE PRACTITIONER

## 2022-05-25 PROCEDURE — 82746 ASSAY OF FOLIC ACID SERUM: CPT | Performed by: NURSE PRACTITIONER

## 2022-05-25 PROCEDURE — 86592 SYPHILIS TEST NON-TREP QUAL: CPT | Performed by: NURSE PRACTITIONER

## 2022-05-25 PROCEDURE — 87389 HIV-1 AG W/HIV-1&-2 AB AG IA: CPT | Performed by: NURSE PRACTITIONER

## 2022-05-25 PROCEDURE — 86255 FLUORESCENT ANTIBODY SCREEN: CPT | Mod: 59 | Performed by: NURSE PRACTITIONER

## 2022-05-26 LAB
HIV 1+2 AB+HIV1 P24 AG SERPL QL IA: NEGATIVE
RPR SER QL: NORMAL

## 2022-05-26 NOTE — PROGRESS NOTES
Subjective:       Patient ID: Jolly Galeas is a 51 y.o. female.    Chief Complaint: Memory issues           HPI The patient is here for memory loss evaluation. The patient is unaccompanied. She lives alone. 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 She reports in her 30s' she started becoming more forgetful. The main problems the patient has are related short term memory loss. For example, the patient states she would forget conversations that she had with her children. The patient states she would be forget to let the garage down.  The patient occasionally forgets where placed certain things but denies putting things in odd places. The patient not forgetting names. The patient is driving and denies getting lost. She states sometimes forget where she is wanting to go.  No confusion around and inside the house. No trouble remembering the date and time, keeping up with medications and appointments and keeping up with major holidays and political changes. The patient is independent in handling finances. The patient is still independent with ADLs. No hallucinations or delusions. No seizures. No behavioral problems. Reports having some word finding diffculty. No problems handling tools. No history of strokes. Patient reports having chronic migraines, started having headaches at age 5 frontal temporal pain radiates to the back head all over, described as  throbbing, pounding, pulsating pain, reports  loss vision in right eye with the onset of headache, with nosebleed. associated s/s nausea/vomiting, light, smell and noise  sensitivity. Patient reports after she vomit she usually feel better. Patinet has complex treatment including: Cefaly, Aimovig injections monthly and Botox every 3 months for preventative treatment. Patient takes Ubrevely prn as an abortive. Failed multiple therapies including: Amitriptyline, VPA, Midodrine, TPM, Propranolol, Relpax, Imitrex, Zomig. [facility]. The patient states she experience 4 headache out the month.  No history of hypothyroidism. No history of alcoholism. Has chronic B12 deficiency. Chronic DEEPTHI, MDD, Bipolar, PTSD. Patient is taking Klonopin, Wellbutrin, Lexapro, Naltrexone, Trazodone.  No history of STDs.  No history of HIV infection. 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  No history of traumatic brain injury. No tremors or abnormal movements. No falls or instability. No urinary incontinence. No change in sleep managed with medications. Good appetite. Parenteral Uncles had AD dementia.     Review of Systems   Constitutional: Negative.    HENT: Negative.    Eyes: Negative.    Respiratory: Negative.    Cardiovascular: Negative.    Gastrointestinal: Negative.    Endocrine: Negative.    Genitourinary: Negative.    Musculoskeletal: Negative.    Allergic/Immunologic: Negative.    Neurological: Positive for headaches.   Hematological: Negative.    Psychiatric/Behavioral: Positive for confusion, decreased concentration, dysphoric mood and sleep disturbance. Negative for agitation, behavioral problems, hallucinations, self-injury and suicidal ideas. The patient is not nervous/anxious and is not hyperactive.                  Current Outpatient Medications:     albuterol (PROVENTIL/VENTOLIN HFA) 90 mcg/actuation inhaler, Inhale 2 puffs into the lungs every 6 (six) hours as needed., Disp: , Rfl:     budesonide-formoterol 160-4.5 mcg (SYMBICORT) 160-4.5 mcg/actuation HFAA, Inhale 2 puffs into the lungs 2 (two) times daily., Disp: 10.2 g, Rfl: 3    buPROPion (WELLBUTRIN XL) 150 MG TB24 tablet, Take 450 mg by mouth nightly. , Disp: , Rfl:     calcium carbonate/vitamin D3 (VITAMIN D-3 ORAL), Take 1 capsule by mouth nightly., Disp: , Rfl:     cetirizine (ZYRTEC) 10 MG tablet, Take 10 mg by mouth every evening. , Disp: , Rfl:     clonazePAM (KLONOPIN) 0.5 MG tablet, Take 0.5 mg by mouth 2 (two) times daily. , Disp: , Rfl:     COLACE 100 mg  capsule, Take 1 capsule by mouth twice a day as needed for constipation., Disp: , Rfl:     cyanocobalamin 1,000 mcg/mL injection, Inject 1,000 mcg into the muscle every 28 days., Disp: , Rfl:     divalproex ER (DEPAKOTE ER) 250 MG 24 hr tablet, TAKE ONE TABLET BY MOUTH AT BEDTIME FOR MOOD DISORDERS, Disp: , Rfl:     erenumab-aooe (AIMOVIG AUTOINJECTOR) 140 mg/mL autoinjector, Inject 140 mg into the skin every 28 days. , Disp: , Rfl:     escitalopram oxalate (LEXAPRO) 10 MG tablet, Take 10 mg by mouth every evening. , Disp: , Rfl:     gentamicin (GARAMYCIN) 0.3 % ophthalmic solution, APPLY 1- 2 DROPS (OPHTHALMIC (EYE)) 3 TIMES PER DAY FOR 7 DAYS, Disp: , Rfl:     LINZESS 290 mcg Cap capsule, Take 290 mcg by mouth once daily., Disp: , Rfl:     magnesium oxide 420 mg Tab, Take 420 mg by mouth nightly. , Disp: , Rfl:     methylPREDNISolone (MEDROL DOSEPACK) 4 mg tablet, use as directed, Disp: 1 each, Rfl: 0    montelukast (SINGULAIR) 10 mg tablet, Take 10 mg by mouth every evening. , Disp: , Rfl:     multivitamin (THERAGRAN) per tablet, Take 1 tablet by mouth., Disp: , Rfl:     naltrexone (DEPADE) 50 mg tablet, Take 50 mg by mouth nightly. , Disp: , Rfl:     onabotulinumtoxinA (BOTOX INJ), Inject as directed. Every 3 months, Disp: , Rfl:     ondansetron (ZOFRAN) 8 MG tablet, Take 1 tablet by mouth every eight hours as needed for nausea., Disp: , Rfl:     pantoprazole (PROTONIX) 40 MG tablet, Take 1 tablet (40 mg total) by mouth once daily., Disp: 30 tablet, Rfl: 11    pregabalin (LYRICA) 150 MG capsule, Take 150 mg by mouth 2 (two) times daily., Disp: , Rfl:     sumatriptan (IMITREX STATDOSE) 6 mg/0.5 mL kit, INJECT 6MG/0.5ML SUBCUTANEOUSLY  AS NEEDED AT ONSET OF HEADACHE MAY REPEAT ONE TIME AFTER 1 HOUR IF NO RELIEF. DO NOT EXCEED 2 INJECTIONS IN 24 HOURS, Disp: , Rfl:     tiZANidine 4 mg Cap, Take 4 mg by mouth 2 (two) times daily as needed. , Disp: , Rfl:     traZODone (DESYREL) 50 MG tablet, Take  50 mg by mouth every evening. Pt taking 2 tablets by mouth in the evening, Disp: , Rfl:     triprolidine-pseudoephedrine (APRODINE) 2.5-60 mg Tab, Take 1 tablet by mouth nightly. , Disp: , Rfl:     ubrogepant (UBRELVY)tablet 100 mg, Take 100 mg by mouth once as needed for Migraine., Disp: , Rfl:     valACYclovir (VALTREX) 1000 MG tablet, Take Valtrex twice a day for 3 days for acute episode, Disp: 30 tablet, Rfl: 0  Past Medical History:   Diagnosis Date    Allergy     Allergy 8/4/2020 4:20:10 PM    [facility] - Injury/Poisoning: Allergy-No Additional Notes    Allergy 8/4/2020 4:20:10 PM    [facility] - Injury/Poisoning: Allergy-No Additional Notes    Anxiety     Anxiety state 8/4/2020 4:19:35 PM    [facility]: Anxiety-No Additional Notes    Anxiety state 8/4/2020 4:19:35 PM    [facility] - HA: Anxiety-No Additional Notes    Arthritis     Asthma     Asthma 8/4/2020 4:20:14 PM    [facility] - Respiratory: Asthma-No Additional Notes    Asthma 8/4/2020 4:20:14 PM    [facility] - Respiratory: Asthma-No Additional Notes    Bipolar disorder     Depression     Hearing loss 8/4/2020 4:19:50 PM    [facility] - HEENT: Hearing Loss-No Additional Notes    Hearing loss 8/4/2020 4:19:50 PM    [facility] - HEENT: Hearing Loss-No Additional Notes    Migraines     Neuropathy     Other depressive disorder 8/4/2020 4:19:29 PM    [facility]: Depression-No Additional Notes    Other depressive disorder 8/4/2020 4:19:29 PM    [facility] - HA: Depression-No Additional Notes    PONV (postoperative nausea and vomiting)     severe    Posttraumatic stress disorder 8/4/2020 4:19:39 PM    [facility] Historical - Unknown: PTSD (post-traumatic stress disorder)-No Additional Notes    Posttraumatic stress disorder 8/4/2020 4:19:39 PM    [facility] Historical - Unknown: PTSD  (post-traumatic stress disorder)-No Additional Notes    Sickle cell trait     Sleep apnea     SUNIL-CPAP     Past Surgical History:   Procedure Laterality Date    ACDF      C 5-6    BILATERAL TUBAL LIGATION      CHOLECYSTECTOMY      ENDOMETRIAL ABLATION  2019    PARATHYROIDECTOMY      ROBOT-ASSISTED LAPAROSCOPIC SLEEVE GASTRECTOMY USING DA JAYLEN XI N/A 6/10/2021    Procedure: XI ROBOTIC SLEEVE GASTRECTOMY;  Surgeon: Samir Cunningham MD;  Location: [facility];  Service: General;  Laterality: N/A;    SURGICAL REMOVAL OF PILONIDAL CYST      TONSILLECTOMY      WEDGE RESECTION, LUNG, ROBOT-ASSISTED, USING VATS, USING DA JAYLEN XI Left      Social History     Socioeconomic History    Marital status: Single    Number of children: 3   Occupational History    Occupation: NP at [employer]   Tobacco Use    Smoking status: Never Smoker    Smokeless tobacco: Never Used   Substance and Sexual Activity    Alcohol use: Yes     Comment: occasionally  No alcohol 72h prior to sx    Drug use: Never    Sexual activity: Yes     Partners: Male     Birth control/protection: None     Social Determinants of Health     Financial Resource Strain: Low Risk     Difficulty of Paying Living Expenses: Not very hard   Food Insecurity: No Food Insecurity    Worried About Running Out of Food in the Last Year: Never true    Ran Out of Food in the Last Year: Never true   Transportation Needs: No Transportation Needs    Lack of Transportation (Medical): No    Lack of Transportation (Non-Medical): No   Physical Activity: Unknown    Days of Exercise per Week: Patient refused   Stress: Unknown    Feeling of Stress : Patient refused   Social Connections: Unknown    Frequency of Communication with Friends and Family: Patient refused    Frequency of Social Gatherings with Friends and Family: Patient refused    Active Member of Clubs or Organizations: Patient refused    Attends Club or Organization Meetings: Patient refused     Marital Status:    Housing Stability: Low Risk     Unable to Pay for Housing in the Last Year: No    Number of Places Lived in the Last Year: 1    Unstable Housing in the Last Year: No             Past/Current Medical/Surgical History, Past/Current Social History, Past/Current Family History and Past/Current Medications were reviewed in detail.        Objective:           VITAL SIGNS WERE REVIEWED      GENERAL APPEARANCE:     The patient looks comfortable became tearful at the thought of her memory defict    Body habitus obese BMI 30.35 KG    No signs of respiratory distress.    Normal breathing pattern.    No dysmorphic features    Normal eye contact.     GENERAL MEDICAL EXAM:    HEENT:  Head is atraumatic normocephalic.     No tender temporal arteries. Fundoscopic (Ophthalmoscopic) exam showed no disc edema.      Neck and Axillae: No JVD. No visible lesions.    No carotid bruits. No thyromegaly. No lymphadenopathy.    Cardiopulmonary: No cyanosis. No tachypnea. Normal respiratory effort.    Gastrointestinal/Urogenital:  No jaundice. No stomas or lesions. No visible hernias. No catheters.     Abdomen is soft non-tender. No masses or organomegaly.    Skin, Hair and Nails: No pathognonomic skin rash. No neurofibromatosis. No visible lesions.No stigmata of autoimmune disease. No clubbing.    Skin is warm and moist. No palpable masses.    Limbs: No varicose veins. No visible swelling.    No palpable edema. Pulses are symmetric. Pedal pulses are palpable.      Muskoskeletal: No visible deformities.No visible lesions.    No spine tenderness. No signs of longstanding neuropathy. No dislocations or fractures.            Neurologic Exam     Mental Status   Oriented to person, place, and time.   Registration: recalls 3 of 3 objects. Recall at 5 minutes: recalls 3 of 3 objects. Follows 3 step commands.   Attention: normal. Concentration: normal.   Speech: speech is normal   Level of consciousness: alert  Knowledge:  good and consistent with education.   Able to name object. Able to read. Able to repeat. Able to write. Normal comprehension.     MOCA 26/30    Visuospatial/Executive 5/5    Naming                            3/3    Attention                         6/6    Language                         3/3    Abstraction                    2/2    Recall                                4/5    Orientation                     6/6        NORMAL-MILD NCD 26-30       Cranial Nerves     CN II   Visual fields full to confrontation.   Visual acuity: normal  Right visual field deficit: none  Left visual field deficit: none     CN III, IV, VI   Pupils are equal, round, and reactive to light.  Extraocular motions are normal.   Right pupil: Size: 3 mm. Shape: regular. Reactivity: brisk. Consensual response: intact.   Left pupil: Size: 3 mm. Shape: regular. Reactivity: brisk. Consensual response: intact.   Nystagmus: none   Diplopia: none  Ophthalmoparesis: none  Upgaze: normal  Downgaze: normal  Conjugate gaze: present  Vestibulo-ocular reflex: present    CN V   Facial sensation intact.   Right facial sensation deficit: none  Left facial sensation deficit: none    CN VII   Facial expression full, symmetric.     CN VIII   CN VIII normal.   Hearing: intact    CN IX, X   CN IX normal.     CN XI   CN XI normal.     CN XII   Tongue: not atrophic  Fasciculations: absent  Tongue deviation: none    Motor Exam   Muscle bulk: normal  Overall muscle tone: normal  Right arm tone: normal  Left arm tone: normal  Right arm pronator drift: absent  Left arm pronator drift: absent  Right leg tone: normal  Left leg tone: normal    Strength   Strength 5/5 throughout.     Sensory Exam   Light touch normal.   Right arm light touch: normal  Left arm light touch: normal  Right leg light touch: normal  Left leg light touch: normal  Vibration normal.   Right arm vibration: normal  Left arm vibration: normal  Right leg vibration: normal  Left leg vibration:  normal  Proprioception normal.   Right arm proprioception: normal  Left arm proprioception: normal  Right leg proprioception: normal  Left leg proprioception: normal  Pinprick normal.   Right arm pinprick: normal  Left arm pinprick: normal  Right leg pinprick: normal  Left leg pinprick: normal  Graphesthesia: normal  Stereognosis: normal    Gait, Coordination, and Reflexes     Gait  Gait: normal    Coordination   Romberg: negative    Tremor   Resting tremor: absent  Intention tremor: absent  Action tremor: absent    Reflexes   Right brachioradialis: 2+  Left brachioradialis: 2+  Right biceps: 2+  Left biceps: 2+  Right triceps: 2+  Left triceps: 2+  Right patellar: 2+  Left patellar: 2+  Right achilles: 1+  Left achilles: 1+  Right plantar: normal  Right Blackman: absent  Left Blackman: absent  Right ankle clonus: absent  Left ankle clonus: absent  Right pendular knee jerk: absent  Left pendular knee jerk: absent      Lab Results   Component Value Date    WBC 9.97 03/09/2022    HGB 14.2 03/09/2022    HCT 41.8 03/09/2022    MCV 85 03/09/2022     03/09/2022     Sodium   Date Value Ref Range Status   10/28/2021 141 136 - 145 mmol/L Final     Potassium   Date Value Ref Range Status   10/28/2021 3.9 3.5 - 5.1 mmol/L Final     Chloride   Date Value Ref Range Status   10/28/2021 102 95 - 110 mmol/L Final     CO2   Date Value Ref Range Status   10/28/2021 29 23 - 29 mmol/L Final     Glucose   Date Value Ref Range Status   10/28/2021 93 70 - 110 mg/dL Final     BUN   Date Value Ref Range Status   10/28/2021 17 6 - 20 mg/dL Final     Creatinine   Date Value Ref Range Status   10/28/2021 0.9 0.5 - 1.4 mg/dL Final     Calcium   Date Value Ref Range Status   10/28/2021 9.8 8.7 - 10.5 mg/dL Final     Total Protein   Date Value Ref Range Status   10/28/2021 7.2 6.0 - 8.4 g/dL Final     Albumin   Date Value Ref Range Status   10/28/2021 3.8 3.5 - 5.2 g/dL Final     Total Bilirubin   Date Value Ref Range Status   10/28/2021 0.4  0.1 - 1.0 mg/dL Final     Comment:     For infants and newborns, interpretation of results should be based  on gestational age, weight and in agreement with clinical  observations.    Premature Infant recommended reference ranges:  Up to 24 hours.............<8.0 mg/dL  Up to 48 hours............<12.0 mg/dL  3-5 days..................<15.0 mg/dL  6-29 days.................<15.0 mg/dL       Alkaline Phosphatase   Date Value Ref Range Status   10/28/2021 99 55 - 135 U/L Final     AST   Date Value Ref Range Status   10/28/2021 37 10 - 40 U/L Final     ALT   Date Value Ref Range Status   10/28/2021 61 (H) 10 - 44 U/L Final     Anion Gap   Date Value Ref Range Status   10/28/2021 10 8 - 16 mmol/L Final     eGFR if    Date Value Ref Range Status   10/28/2021 >60.0 >60 mL/min/1.73 m^2 Final     eGFR if non    Date Value Ref Range Status   10/28/2021 >60.0 >60 mL/min/1.73 m^2 Final     Comment:     Calculation used to obtain the estimated glomerular filtration  rate (eGFR) is the CKD-EPI equation.        Lab Results   Component Value Date    ZRRDFXEX52 576 05/25/2022     Lab Results   Component Value Date    TSH 0.807 05/25/2022    FREET4 0.83 05/17/2021     Labs Reviewed     6-3-2022     Paraneoplastic autoantibody evaluation, MMA-B12, folate-HC, TSH NL      MRI Brain WO Results:    05-    No acute intracranial abnormality.     Paranasal sinus disease as above.     Normal MRI Brain   No results found in the last 24 hours.  No results found in the last 24 hours.      Reviewed the neuroimaging independently       Assessment:       1. Cognitive changes    2. Memory deficit    3. Anxiety    4. Bipolar disorder with moderate depression    5. Intractable migraine without aura and without status migrainosus    6. SUNIL on CPAP    7. Forgetfulness        Plan:           COGNITIVE CHANGES/ MEMORY DEFICIT/ ANXIETY/ BIPOLAR DISORDER WITH MODERATED DEPRESSION/ SUNIL ON CPAP/ FORGETFULNESS/CHRONIC  BENZODIAZEPINE Rule OUT SEIZURE     EVALUATION     Brain MRI to evaluate the frontal and temporal lobes.    T4, FA, HC, B12, MMA, RPR to evaluate for treatable causes of memory loss.    Evaluate EEG     Comprehensive Neuropsychological Testing.    Explained to the patient and family that medications are intended to slow down the progression and not stop it or reverse the disease.The disease is relentless, progressive and so far cannot be controlled.     I counseled the patient and family that the rate of progression is extremely variable and the average (10 years) is an inaccurate measure.     Will hold off on DMA at this time, awaiting CNP recommendations.     Optimization of MDD/DEEPTHI/PTSD care Follow up with psychiatry     HOME CARE     Falling Down Precautions. Gait is affected by the disease as well.     Avoid access to firearms     Help with finances and decision making.    Join support group.    Proofing the house and use labeling.    Avoid antihistamines and anticholinergics.    Avoid changing routine.    Use written reminders.    Avoid multitasking.    Healthy diet, exercise (physical and cognitive).    Good sleep hygiene.      CHRONIC MIGRAINES WITH AURA     DISCUSSED THE THREE-FOLD MANAGEMENT OF MIGRAINE:       LIFESTYLE CHANGES:      Good sleep hygiene  Avoid general triggers like lack of sleep/too much sleep, prolonged sun exposure, excessive screen time and specific triggers based on you own diary   Minimize physical and emotional stress  Smoking avoidance and cessation  Limit caffeine drinks to 1-2 a day   Good hydration   Small frequent meals and avoid skipping meals   Moderate 30-minute-long aerobic exercises 3 times/week. Avoid strenuous exercise         ABORTIVE MEDICATIONS:      [facility] by [facility]    Continue Ubrelvy managed per [facility]    Should only be taken 2-3 times/week to avoid rebound and overuse headaches.     I-explained to the patient that pain meds especially triptans  should NOT be taken daily to avoid Rebound Headache and Overuse Headache.    PREVENTATIVE MEDICATIONS:    HA Managed by [facility]    Continue Cefaly    Continue BOTOX every 3 months    Continue Aimovig 140 mg po Monthly       Failed multiple therapies including: Amitriptyline, VPA, Midodrine, TPM, Propranolol, Relpax, Imitrex, Zomig    MEDICAL/SURGICAL COMORBIDITIES     All relevant medical comorbidities noted and managed by primary care physician and medical care team.          MISCELLANEOUS MEDICAL PROBLEMS       HEALTHY LIFESTYLE AND PREVENTATIVE CARE    Encouraged the patient to adhere to the age-appropriate health maintenance guidelines including screening tests and vaccinations.     Discussed the overall importance of healthy lifestyle, optimal weight, exercise, healthy diet, good sleep hygiene and avoiding drugs including smoking, alcohol and recreational drugs. The patient verbalized full understanding.       Advised the patient to follow COVID-19 prevention measures.       I spent 100 minutes face to face with the patient    Time spent in counseling and coordination of care including discussions etiology of diagnosis, pathogenesis of diagnosis, prognosis of diagnosis,, diagnostic results, impression and recommendations, diagnostic studies, management, risks and benefits of treatment, instructions of disease self-management, treatment instructions, follow up requirements, patient and family counseling/involvement in care compliance with treatment regimen. All of the patient's questions were answered during this discussion.       Surjit Silver, MSN NP      Collaborating Provider: Gopi Jameson MD, FAAN Neurologist/Epileptologist

## 2022-05-27 ENCOUNTER — OFFICE VISIT (OUTPATIENT)
Dept: NEUROLOGY | Facility: CLINIC | Age: 52
End: 2022-05-27
Payer: OTHER GOVERNMENT

## 2022-05-27 VITALS
BODY MASS INDEX: 30.42 KG/M2 | WEIGHT: 193.81 LBS | DIASTOLIC BLOOD PRESSURE: 78 MMHG | RESPIRATION RATE: 16 BRPM | HEIGHT: 67 IN | OXYGEN SATURATION: 95 % | SYSTOLIC BLOOD PRESSURE: 114 MMHG | HEART RATE: 75 BPM

## 2022-05-27 DIAGNOSIS — R41.89 COGNITIVE CHANGES: Primary | ICD-10-CM

## 2022-05-27 DIAGNOSIS — F41.9 ANXIETY: ICD-10-CM

## 2022-05-27 DIAGNOSIS — Z79.899 CHRONIC PRESCRIPTION BENZODIAZEPINE USE: ICD-10-CM

## 2022-05-27 DIAGNOSIS — R68.89 FORGETFULNESS: ICD-10-CM

## 2022-05-27 DIAGNOSIS — R41.3 MEMORY DEFICIT: ICD-10-CM

## 2022-05-27 DIAGNOSIS — F31.32 BIPOLAR DISORDER WITH MODERATE DEPRESSION: ICD-10-CM

## 2022-05-27 DIAGNOSIS — G43.019 INTRACTABLE MIGRAINE WITHOUT AURA AND WITHOUT STATUS MIGRAINOSUS: ICD-10-CM

## 2022-05-27 DIAGNOSIS — G47.33 OSA ON CPAP: ICD-10-CM

## 2022-05-27 PROCEDURE — 99417 PROLNG OP E/M EACH 15 MIN: CPT | Mod: S$GLB,,, | Performed by: NURSE PRACTITIONER

## 2022-05-27 PROCEDURE — 99205 OFFICE O/P NEW HI 60 MIN: CPT | Mod: S$GLB,,, | Performed by: NURSE PRACTITIONER

## 2022-05-27 PROCEDURE — 99417 PR PROLONGED SVC, OUTPT, W/WO DIRECT PT CONTACT,  EA ADDTL 15 MIN: ICD-10-PCS | Mod: S$GLB,,, | Performed by: NURSE PRACTITIONER

## 2022-05-27 PROCEDURE — 99205 PR OFFICE/OUTPT VISIT, NEW, LEVL V, 60-74 MIN: ICD-10-PCS | Mod: S$GLB,,, | Performed by: NURSE PRACTITIONER

## 2022-05-27 PROCEDURE — 99999 PR PBB SHADOW E&M-EST. PATIENT-LVL V: CPT | Mod: PBBFAC,,, | Performed by: NURSE PRACTITIONER

## 2022-05-27 PROCEDURE — 99999 PR PBB SHADOW E&M-EST. PATIENT-LVL V: ICD-10-PCS | Mod: PBBFAC,,, | Performed by: NURSE PRACTITIONER

## 2022-05-30 ENCOUNTER — HOSPITAL ENCOUNTER (OUTPATIENT)
Dept: RADIOLOGY | Facility: HOSPITAL | Age: 52
Discharge: HOME | End: 2022-05-30
Attending: NURSE PRACTITIONER
Payer: OTHER GOVERNMENT

## 2022-05-30 ENCOUNTER — TELEPHONE (OUTPATIENT)
Dept: NEUROLOGY | Facility: CLINIC | Age: 52
End: 2022-05-30
Payer: OTHER GOVERNMENT

## 2022-05-30 DIAGNOSIS — R41.3 MEMORY DEFICIT: ICD-10-CM

## 2022-05-30 DIAGNOSIS — R41.89 COGNITIVE CHANGES: ICD-10-CM

## 2022-05-30 PROCEDURE — 70551 MRI BRAIN STEM W/O DYE: CPT | Mod: TC

## 2022-05-30 NOTE — PROGRESS NOTES
MRI Brain WO Results:    05-    No acute intracranial abnormality.     Paranasal sinus disease as above.     Normal MRI Brain

## 2022-05-30 NOTE — TELEPHONE ENCOUNTER
----- Message from Park Silver NP sent at 5/30/2022  1:15 PM CDT -----  MRI Brain WO Results:    05-    No acute intracranial abnormality.     Paranasal sinus disease as above.     Normal MRI Brain

## 2022-06-01 ENCOUNTER — PATIENT MESSAGE (OUTPATIENT)
Dept: INTERNAL MEDICINE | Facility: CLINIC | Age: 52
End: 2022-06-01
Payer: OTHER GOVERNMENT

## 2022-06-01 LAB — METHYLMALONATE SERPL-SCNC: 0.17 UMOL/L

## 2022-06-03 ENCOUNTER — TELEPHONE (OUTPATIENT)
Dept: NEUROLOGY | Facility: CLINIC | Age: 52
End: 2022-06-03
Payer: OTHER GOVERNMENT

## 2022-06-03 NOTE — PROGRESS NOTES
Labs Reviewed    6-3-2022    Paraneoplastic autoantibody evaluation, MMA-B12, folate-HC, TSH NL    RPR non-reactive, HIV negative

## 2022-07-06 ENCOUNTER — TELEPHONE (OUTPATIENT)
Dept: NEUROLOGY | Facility: CLINIC | Age: 52
End: 2022-07-06
Payer: OTHER GOVERNMENT

## 2022-07-06 NOTE — TELEPHONE ENCOUNTER
----- Message from Melany Foss sent at 7/6/2022  9:45 AM CDT -----  Contact: Patient  Patient called to consult with nurse or staff regarding her EEG. She would like to reschedule the appointment and would like a call back. Patient would like a call back and can be reached at [phone]. Thanks/MR

## 2022-07-14 ENCOUNTER — PATIENT MESSAGE (OUTPATIENT)
Dept: INTERNAL MEDICINE | Facility: CLINIC | Age: 52
End: 2022-07-14
Payer: OTHER GOVERNMENT

## 2022-07-18 LAB
AMPHIPHYSIN AB TITR SER: NEGATIVE TITER
CV2 IGG TITR SER: NEGATIVE TITER
GLIAL NUC TYPE 1 AB TITR SER: NEGATIVE TITER
HU1 AB TITR SER: NEGATIVE TITER
HU2 AB TITR SER IF: NEGATIVE TITER
HU3 AB TITR SER: NEGATIVE TITER
IMMUNOLOGIST REVIEW: NORMAL
NACHR AB SER-SCNC: NORMAL NMOL/ML
PAVAL REFLEX TEST ADDED: NORMAL
PCA-1 AB TITR SER: NEGATIVE TITER
PCA-TR AB TITR SER: NEGATIVE TITER
PURKINJE CELL CYTOPLASMIC AB TYPE2: NEGATIVE TITER
VGCC-P/Q BIND AB SER-SCNC: 0 NMOL/L
VGKC AB SER-SCNC: 0 NMOL/L

## 2022-07-21 DIAGNOSIS — Z86.19 HISTORY OF HERPES GENITALIS: ICD-10-CM

## 2022-07-21 RX ORDER — VALACYCLOVIR HYDROCHLORIDE 1 G/1
TABLET, FILM COATED ORAL
Qty: 30 TABLET | Refills: 0 | Status: SHIPPED | OUTPATIENT
Start: 2022-07-21

## 2022-07-21 NOTE — TELEPHONE ENCOUNTER
No new care gaps identified.  [facility] Embedded Care Gaps. Reference number: [id number]. 7/21/2022   9:34:34 AM DANIELT

## 2022-08-09 ENCOUNTER — PATIENT MESSAGE (OUTPATIENT)
Dept: ADMINISTRATIVE | Facility: HOSPITAL | Age: 52
End: 2022-08-09
Payer: OTHER GOVERNMENT

## 2022-09-09 ENCOUNTER — PATIENT MESSAGE (OUTPATIENT)
Dept: NEUROLOGY | Facility: CLINIC | Age: 52
End: 2022-09-09
Payer: OTHER GOVERNMENT

## 2022-10-04 ENCOUNTER — PATIENT MESSAGE (OUTPATIENT)
Dept: ADMINISTRATIVE | Facility: HOSPITAL | Age: 52
End: 2022-10-04
Payer: OTHER GOVERNMENT

## 2022-11-01 ENCOUNTER — PATIENT MESSAGE (OUTPATIENT)
Dept: NEUROLOGY | Facility: CLINIC | Age: 52
End: 2022-11-01
Payer: OTHER GOVERNMENT

## 2022-11-04 ENCOUNTER — HOSPITAL ENCOUNTER (OUTPATIENT)
Dept: RADIOLOGY | Facility: HOSPITAL | Age: 52
Discharge: HOME | End: 2022-11-04
Attending: FAMILY MEDICINE
Payer: OTHER GOVERNMENT

## 2022-11-04 VITALS — WEIGHT: 193.81 LBS | HEIGHT: 67 IN | BODY MASS INDEX: 30.42 KG/M2

## 2022-11-04 DIAGNOSIS — Z12.31 ENCOUNTER FOR SCREENING MAMMOGRAM FOR MALIGNANT NEOPLASM OF BREAST: ICD-10-CM

## 2022-11-04 PROCEDURE — 77063 BREAST TOMOSYNTHESIS BI: CPT | Mod: TC

## 2022-11-04 PROCEDURE — 77063 BREAST TOMOSYNTHESIS BI: CPT | Mod: 26,,, | Performed by: RADIOLOGY

## 2022-11-04 PROCEDURE — 77067 MAMMO DIGITAL SCREENING BILAT WITH TOMO: ICD-10-PCS | Mod: 26,,, | Performed by: RADIOLOGY

## 2022-11-04 PROCEDURE — 77067 SCR MAMMO BI INCL CAD: CPT | Mod: 26,,, | Performed by: RADIOLOGY

## 2022-11-04 PROCEDURE — 77063 MAMMO DIGITAL SCREENING BILAT WITH TOMO: ICD-10-PCS | Mod: 26,,, | Performed by: RADIOLOGY

## 2022-11-04 PROCEDURE — 77067 SCR MAMMO BI INCL CAD: CPT | Mod: TC

## 2022-11-07 ENCOUNTER — PATIENT MESSAGE (OUTPATIENT)
Dept: INTERNAL MEDICINE | Facility: CLINIC | Age: 52
End: 2022-11-07
Payer: OTHER GOVERNMENT

## 2022-11-28 ENCOUNTER — OFFICE VISIT (OUTPATIENT)
Dept: NEUROLOGY | Facility: CLINIC | Age: 52
End: 2022-11-28
Payer: OTHER GOVERNMENT

## 2022-11-28 DIAGNOSIS — J84.9 INTERSTITIAL LUNG DISEASE: ICD-10-CM

## 2022-11-28 DIAGNOSIS — F41.1 GAD (GENERALIZED ANXIETY DISORDER): ICD-10-CM

## 2022-11-28 DIAGNOSIS — F43.10 PTSD (POST-TRAUMATIC STRESS DISORDER): ICD-10-CM

## 2022-11-28 DIAGNOSIS — R41.89 COGNITIVE CHANGES: Primary | ICD-10-CM

## 2022-11-28 DIAGNOSIS — F31.9 BIPOLAR 1 DISORDER: ICD-10-CM

## 2022-11-28 DIAGNOSIS — G47.33 OSA (OBSTRUCTIVE SLEEP APNEA): ICD-10-CM

## 2022-11-28 PROCEDURE — 99999 PR PBB SHADOW E&M-EST. PATIENT-LVL III: ICD-10-PCS | Mod: PBBFAC,,, | Performed by: STUDENT IN AN ORGANIZED HEALTH CARE EDUCATION/TRAINING PROGRAM

## 2022-11-28 PROCEDURE — 99499 UNLISTED E&M SERVICE: CPT | Mod: S$GLB,,, | Performed by: STUDENT IN AN ORGANIZED HEALTH CARE EDUCATION/TRAINING PROGRAM

## 2022-11-28 PROCEDURE — 99499 NO LOS: ICD-10-PCS | Mod: S$GLB,,, | Performed by: STUDENT IN AN ORGANIZED HEALTH CARE EDUCATION/TRAINING PROGRAM

## 2022-11-28 PROCEDURE — 90791 PR PSYCHIATRIC DIAGNOSTIC EVALUATION: ICD-10-PCS | Mod: S$GLB,,, | Performed by: STUDENT IN AN ORGANIZED HEALTH CARE EDUCATION/TRAINING PROGRAM

## 2022-11-28 PROCEDURE — 90791 PSYCH DIAGNOSTIC EVALUATION: CPT | Mod: S$GLB,,, | Performed by: STUDENT IN AN ORGANIZED HEALTH CARE EDUCATION/TRAINING PROGRAM

## 2022-11-28 PROCEDURE — 99999 PR PBB SHADOW E&M-EST. PATIENT-LVL III: CPT | Mod: PBBFAC,,, | Performed by: STUDENT IN AN ORGANIZED HEALTH CARE EDUCATION/TRAINING PROGRAM

## 2022-11-28 NOTE — PATIENT INSTRUCTIONS
Today you completed the interview portion of your neuropsychological evaluation. The next step will be to come in for a testing appointment, scheduled for 12/12/2022. Some helpful information is included below to help you be ready for your testing appointment.    Please call Dr. Larsen at [phone] or send a [employer] message with any questions.     _____________________  Nish Larsen, Ph.D.  Neuropsychologist  [facility] Department [facility] Neurology      Instructions for your upcoming neuropsychological assessment    If you must cancel your appointment please do so 48 hours in advance of your scheduled appointment.   Please take all of your medications as prescribed on the date of testing, unless specifically asked not to do so.   Be sure to eat a full breakfast the morning before testing if you typically eat in the morning.   Bring any water, snacks, or other food supplies you may need for a 2-4 hour appointment.  Do your best to get a good night of sleep before testing.  There is nothing you should do or need to do to study for the tests. Just come ready to do your best.
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
## 2022-12-09 ENCOUNTER — HOSPITAL ENCOUNTER (OUTPATIENT)
Dept: RADIOLOGY | Facility: HOSPITAL | Age: 52
Discharge: HOME | End: 2022-12-09
Attending: FAMILY MEDICINE
Payer: OTHER GOVERNMENT

## 2022-12-09 DIAGNOSIS — R92.8 ABNORMAL MAMMOGRAM: ICD-10-CM

## 2022-12-09 PROCEDURE — 77065 DX MAMMO INCL CAD UNI: CPT | Mod: 26,LT,, | Performed by: RADIOLOGY

## 2022-12-09 PROCEDURE — 76642 ULTRASOUND BREAST LIMITED: CPT | Mod: TC,LT

## 2022-12-09 PROCEDURE — 77061 MAMMO DIGITAL DIAGNOSTIC LEFT WITH TOMO: ICD-10-PCS | Mod: 26,LT,, | Performed by: RADIOLOGY

## 2022-12-09 PROCEDURE — 77061 BREAST TOMOSYNTHESIS UNI: CPT | Mod: 26,LT,, | Performed by: RADIOLOGY

## 2022-12-09 PROCEDURE — 77065 DX MAMMO INCL CAD UNI: CPT | Mod: TC,LT

## 2022-12-09 PROCEDURE — 77065 MAMMO DIGITAL DIAGNOSTIC LEFT WITH TOMO: ICD-10-PCS | Mod: 26,LT,, | Performed by: RADIOLOGY

## 2022-12-09 PROCEDURE — 76642 US BREAST LEFT LIMITED: ICD-10-PCS | Mod: 26,LT,, | Performed by: RADIOLOGY

## 2022-12-09 PROCEDURE — 76642 ULTRASOUND BREAST LIMITED: CPT | Mod: 26,LT,, | Performed by: RADIOLOGY

## 2022-12-12 ENCOUNTER — OFFICE VISIT (OUTPATIENT)
Dept: NEUROLOGY | Facility: CLINIC | Age: 52
End: 2022-12-12
Payer: OTHER GOVERNMENT

## 2022-12-12 DIAGNOSIS — F41.9 ANXIETY: ICD-10-CM

## 2022-12-12 DIAGNOSIS — R41.89 COGNITIVE CHANGES: ICD-10-CM

## 2022-12-12 DIAGNOSIS — R41.3 MEMORY DEFICIT: ICD-10-CM

## 2022-12-12 PROCEDURE — 99999 PR PBB SHADOW E&M-EST. PATIENT-LVL II: CPT | Mod: PBBFAC,,,

## 2022-12-12 PROCEDURE — 99499 NO LOS: ICD-10-PCS | Mod: S$GLB,,, | Performed by: STUDENT IN AN ORGANIZED HEALTH CARE EDUCATION/TRAINING PROGRAM

## 2022-12-12 PROCEDURE — 99499 UNLISTED E&M SERVICE: CPT | Mod: S$GLB,,, | Performed by: STUDENT IN AN ORGANIZED HEALTH CARE EDUCATION/TRAINING PROGRAM

## 2022-12-12 PROCEDURE — 99999 PR PBB SHADOW E&M-EST. PATIENT-LVL II: ICD-10-PCS | Mod: PBBFAC,,,

## 2022-12-12 NOTE — PROGRESS NOTES
Ms. Galeas presented for neuropsychological assessment. Because prior authorization remains pending form her insurance company regarding assessment we will defer testing at this time. No services were provided during this non-billed encounter.

## 2022-12-16 ENCOUNTER — TELEPHONE (OUTPATIENT)
Dept: NEUROLOGY | Facility: CLINIC | Age: 52
End: 2022-12-16
Payer: OTHER GOVERNMENT

## 2022-12-16 DIAGNOSIS — F31.32 BIPOLAR DISORDER WITH MODERATE DEPRESSION: ICD-10-CM

## 2022-12-16 DIAGNOSIS — F43.10 PTSD (POST-TRAUMATIC STRESS DISORDER): ICD-10-CM

## 2022-12-16 DIAGNOSIS — R41.89 COGNITIVE CHANGES: Primary | ICD-10-CM

## 2022-12-27 ENCOUNTER — OFFICE VISIT (OUTPATIENT)
Dept: NEUROLOGY | Facility: CLINIC | Age: 52
End: 2022-12-27
Payer: OTHER GOVERNMENT

## 2022-12-27 DIAGNOSIS — F31.32 BIPOLAR DISORDER WITH MODERATE DEPRESSION: ICD-10-CM

## 2022-12-27 DIAGNOSIS — R41.9 COGNITIVE COMPLAINTS: ICD-10-CM

## 2022-12-27 DIAGNOSIS — F43.10 PTSD (POST-TRAUMATIC STRESS DISORDER): Primary | ICD-10-CM

## 2022-12-27 PROCEDURE — 99999 PR PBB SHADOW E&M-EST. PATIENT-LVL II: ICD-10-PCS | Mod: PBBFAC,,, | Performed by: STUDENT IN AN ORGANIZED HEALTH CARE EDUCATION/TRAINING PROGRAM

## 2022-12-27 PROCEDURE — 99499 UNLISTED E&M SERVICE: CPT | Mod: S$GLB,,, | Performed by: STUDENT IN AN ORGANIZED HEALTH CARE EDUCATION/TRAINING PROGRAM

## 2022-12-27 PROCEDURE — 99499 NO LOS: ICD-10-PCS | Mod: S$GLB,,, | Performed by: STUDENT IN AN ORGANIZED HEALTH CARE EDUCATION/TRAINING PROGRAM

## 2022-12-27 PROCEDURE — 99999 PR PBB SHADOW E&M-EST. PATIENT-LVL II: CPT | Mod: PBBFAC,,, | Performed by: STUDENT IN AN ORGANIZED HEALTH CARE EDUCATION/TRAINING PROGRAM

## 2023-01-06 NOTE — PROGRESS NOTES
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 appeared generally intact.    Attention and Orientation: Ms. Galeas appeared alert and was oriented to person, place, time, and situation.     Thought processes: Ms. Dorans thought processes appeared logical, sequential, and goal oriented. There was no evidence of hallucination or delusions. Insight and judgment appeared intact.    Mood/affect:  Rapport was easy to establish and maintain. Her affect was often restricted in in range, with instances of sudden tearfulness, as below. Affect appeared inconsistent with stated euthymic mood. Behavior was within normal limits.     Test observations: Ms. Galeas completed testing at a slow and steady pace. The process of testing was significantly distressing for Ms. Galeas in spite of reportedly euthymic mood. At several points during testing, and in particular during tests that were face-valid as memory measures, she became suddenly tearful at times requiring breaks  or interruptions to standardized administration to accommodate. This occurred after trial 3 of a verbal list-learning task and an examination of her raw scores suggest that her performance was significantly affected by her observed distress.    Results     Tests Administered (Manual norms used unless otherwise indicated): [employer] - Test of Premorbid Functioning (TOPF), Dot Counting Test (DCT), TOMM, Wechsler Adult Intelligence Scale 4th Ed. (WAIS-IV), Digit Vigilance Test (DVT; [facility] norms), Neuropsychological Assessment Battery (NAB) Naming, [place] Verbal Learning Test, 3rd Ed. (CVLT-3), Wechsler Memory Scale, 4th Ed. Logical Memory (WMS IV-LM), Wechsler Memory Scale, 4th Ed. Visual Reproduction (WMS IV-VR), Mehrdad-Osterrieth Complex Figure Test (RCFT) Copy trial, Sonia-Irwin Executive Functioning Scale (DKEFS) Verbal Fluency and Color Word Interference tests, Trail Making Test (TMT A/B; [place] norms), Grooved Pegboard Test ([facility] norms), and Personality Assessment Inventory (LIAM).    Performance Validity: Ms. Galeas completed both stand-alone and embedded measures of task engagement. Below-cutoff performance on any one stand-alone measure and/ or any two embedded measures of task engagement is highly unlikely to occur outside the context of poor or inconsistent effort during testing. Ms. Galeas scored below predetermined cutoffs on 2/4 stand-alone and 1/4 embedded measures of task engagement. No scores were at a level that suggests Ms. Galeas knew and deliberately selected against correct responses. As such, there is evidence for variable effort across this assessment. In this context, cognitive test scores that are below normal limits cannot be reliably interpreted to reflect central nervous system dysfunction. Cognitive test scores that are within or above normal limits, however, can be reliably inferred to reflect at least minimally-intact functioning in relevant domains.     Due to  performance validity concerns, a data table of cognitive test scores is not included in this document to reduce the risk of incorrect interpretation of resultant test scores. Results may, however, be made available to a qualified provider upon request.       Mood: Ms. Galeas completed the LIAM: a standardized inventory designed to assess a broad range of personality and psychological functions.    Validity: Ms. Dorans responses were internally consistent, free of idiosyncratic responding, and free of evidence for significant impression management. As such, her resultant profile is deemed valid for interpretation.     Clinical: Ms. Dorans responses produced elevations of more than one clinical scale, suggesting considerable distress arising from more than one source and the possibility for multiple diagnoses. In particular her responses were notable for clinically significant elevations on scales that assess anxiety-related disorders, depression, thought disturbances, and somatic complaints. She endorsed a considerable level of anxiety and distress characterized by hypervigilance, avoidance, and maladaptive behaviors aimed at controlling anxiety related to a past history of traumatic experiences, consistent with her report during interview and standing diagnosis of PTSD. This assessment suggests that Ms. Dorans level of distress related to PTSD remains a clinically significant concern. Also consistent with her history of bipolar disorder and associated depression, Ms. Galeas endorsed clinically significant symptoms of depression consistent with a major depressive episode.  Additionally, the level of distress that she derives from somatic or bodily concerns is itself a significant concern beyond the direct consequences or effects of her physical conditions.     Interpersonal: Ms. Galeas's responses suggest that she sees herself as distant in many interpersonal relationships. She may feel withdrawn or  uncomfortable when engaging with others; and may perceive herself to be shy. As such, she may have difficulty advocating for her needs in some circumstances.     Critical Items: Ms. Galeas did not endorse critical items concerning for increased risk of suicide. She did endorse several critical items that assess a history of traumatic stressors.     Billing Documentation     Time spent conducting face to face interview with the patient: 58 minutes; Billed separately.  Time on review of neuropsychological test data and relevant records, data interpretation, and writing/ documentation: 187 minutes; [id number] &82070 (x2).  Psychometrist time spent in the administration and scoring of 2 or more neuropsychological tests 295 minutes; 53439 & 64022 (x9).     Referral Diagnoses:   R41.89 (ICD-10-CM) - Cognitive changes   F31.32 (ICD-10-CM) - Bipolar disorder with moderate depression   F43.10 (ICD-10-CM) - PTSD (post-traumatic stress disorder)     Signatures     Thank you for the opportunity to assist in the care of your patient. Please do not hesitate to contact me at [phone] or via Epic staff message if I can be of further assistance.          _____________________  Nish Larsen, Ph.D.  Neuropsychologist  [facility]  Department of Neurology

## 2023-01-10 ENCOUNTER — PATIENT MESSAGE (OUTPATIENT)
Dept: NEUROLOGY | Facility: CLINIC | Age: 53
End: 2023-01-10
Payer: OTHER GOVERNMENT

## 2023-01-10 ENCOUNTER — OFFICE VISIT (OUTPATIENT)
Dept: NEUROLOGY | Facility: CLINIC | Age: 53
End: 2023-01-10
Payer: OTHER GOVERNMENT

## 2023-01-10 DIAGNOSIS — R41.9 COGNITIVE COMPLAINTS: ICD-10-CM

## 2023-01-10 DIAGNOSIS — F31.32 BIPOLAR DISORDER WITH MODERATE DEPRESSION: ICD-10-CM

## 2023-01-10 DIAGNOSIS — F43.10 PTSD (POST-TRAUMATIC STRESS DISORDER): Primary | ICD-10-CM

## 2023-01-10 PROCEDURE — 99999 PR PBB SHADOW E&M-EST. PATIENT-LVL II: ICD-10-PCS | Mod: PBBFAC,,, | Performed by: STUDENT IN AN ORGANIZED HEALTH CARE EDUCATION/TRAINING PROGRAM

## 2023-01-10 PROCEDURE — 99499 UNLISTED E&M SERVICE: CPT | Mod: S$GLB,,, | Performed by: STUDENT IN AN ORGANIZED HEALTH CARE EDUCATION/TRAINING PROGRAM

## 2023-01-10 PROCEDURE — 99999 PR PBB SHADOW E&M-EST. PATIENT-LVL II: CPT | Mod: PBBFAC,,, | Performed by: STUDENT IN AN ORGANIZED HEALTH CARE EDUCATION/TRAINING PROGRAM

## 2023-01-10 PROCEDURE — 99499 NO LOS: ICD-10-PCS | Mod: S$GLB,,, | Performed by: STUDENT IN AN ORGANIZED HEALTH CARE EDUCATION/TRAINING PROGRAM

## 2023-01-10 NOTE — PROGRESS NOTES
Ms. Galeas attended a in-person feedback session to discuss the results from her recent neuropsychological testing (see report dated 12/12/2022). We discussed her test results, diagnoses, and my recommendations. Ms. Galeas voiced her understanding of the information provided, and voiced her intention to follow through on the recommendations provided. All questions were answered to her satisfaction and Ms. Galeas was provided with a copy of her report. she was encouraged to contact our office with any future questions or concerns.         _____________________  Nish Larsen, Ph.D.  Neuropsychologist  [facility]  Department of Neurology     Billing Information:   Time spent discussing test results with the patient: 38 minutes  Total time spent for this encounter, including discussion of test results, review of pertinent records, and documentation of this encounter: 42 minutes  [id number] (1)

## 2023-10-25 NOTE — TELEPHONE ENCOUNTER
----- Message from Jessie Sandoval NP sent at 6/3/2022  3:47 PM CDT -----  Labs Reviewed    6-3-2022    Paraneoplastic autoantibody evaluation, MMA-B12, folate-HC, TSH NL    RPR non-reactive, HIV negative   Patient called and I scheduled him as a new patient on 1/5/2023 for varicose veins. He has a couple of questions that I thought you could answer regarding potential surgical costs and recovery period.

## 2023-12-20 DIAGNOSIS — Z12.31 OTHER SCREENING MAMMOGRAM: ICD-10-CM

## 2024-11-26 ENCOUNTER — PATIENT MESSAGE (OUTPATIENT)
Dept: INTERNAL MEDICINE | Facility: CLINIC | Age: 54
End: 2024-11-26
Payer: OTHER GOVERNMENT

## 2025-04-16 ENCOUNTER — OFFICE VISIT (OUTPATIENT)
Dept: NEUROLOGY | Facility: CLINIC | Age: 55
End: 2025-04-16
Payer: COMMERCIAL

## 2025-04-16 VITALS
WEIGHT: 180 LBS | BODY MASS INDEX: 28.25 KG/M2 | RESPIRATION RATE: 18 BRPM | DIASTOLIC BLOOD PRESSURE: 69 MMHG | HEIGHT: 67 IN | SYSTOLIC BLOOD PRESSURE: 108 MMHG

## 2025-04-16 DIAGNOSIS — M17.0 PRIMARY OSTEOARTHRITIS OF BOTH KNEES: ICD-10-CM

## 2025-04-16 DIAGNOSIS — F41.9 ANXIETY: ICD-10-CM

## 2025-04-16 DIAGNOSIS — G25.1 MEDICATION-INDUCED POSTURAL TREMOR: ICD-10-CM

## 2025-04-16 DIAGNOSIS — K21.9 GASTROESOPHAGEAL REFLUX DISEASE, UNSPECIFIED WHETHER ESOPHAGITIS PRESENT: ICD-10-CM

## 2025-04-16 DIAGNOSIS — G47.33 OSA ON CPAP: ICD-10-CM

## 2025-04-16 DIAGNOSIS — Z79.899 CHRONIC PRESCRIPTION BENZODIAZEPINE USE: ICD-10-CM

## 2025-04-16 DIAGNOSIS — Z80.0 FAMILY HISTORY OF COLON CANCER: ICD-10-CM

## 2025-04-16 DIAGNOSIS — G43.019 INTRACTABLE MIGRAINE WITHOUT AURA AND WITHOUT STATUS MIGRAINOSUS: ICD-10-CM

## 2025-04-16 DIAGNOSIS — G47.429 NARCOLEPSY DUE TO UNDERLYING CONDITION WITHOUT CATAPLEXY: ICD-10-CM

## 2025-04-16 DIAGNOSIS — G25.0 ESSENTIAL TREMOR: ICD-10-CM

## 2025-04-16 DIAGNOSIS — R41.89 COGNITIVE CHANGES: ICD-10-CM

## 2025-04-16 DIAGNOSIS — F31.32 BIPOLAR DISORDER WITH MODERATE DEPRESSION: ICD-10-CM

## 2025-04-16 DIAGNOSIS — K58.1 IRRITABLE BOWEL SYNDROME WITH CONSTIPATION: ICD-10-CM

## 2025-04-16 DIAGNOSIS — F43.10 PTSD (POST-TRAUMATIC STRESS DISORDER): ICD-10-CM

## 2025-04-16 DIAGNOSIS — R25.1 TREMORS OF NERVOUS SYSTEM: Primary | ICD-10-CM

## 2025-04-16 PROCEDURE — 99999 PR PBB SHADOW E&M-EST. PATIENT-LVL V: CPT | Mod: PBBFAC,,, | Performed by: NURSE PRACTITIONER

## 2025-04-16 RX ORDER — PRIMIDONE 50 MG/1
50 TABLET ORAL NIGHTLY
Qty: 30 TABLET | Refills: 11 | Status: SHIPPED | OUTPATIENT
Start: 2025-04-16 | End: 2026-04-16
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
## 2025-04-16 NOTE — PATIENT INSTRUCTIONS
Avoid stimulants like caffeine, nicotineetc.    Cut down steroids if possible    Avoid hot drinks, drink from half empty glasses and wear heavy bracelet/watch.     Will start primidone and titrate slowly to 50 mg QHS as a starting dose. The main side effect is sedation and was communicated with the patient. Sexual dysfunction and depression could happen as well. The patient verbalized understanding.

## 2025-04-28 ENCOUNTER — HOSPITAL ENCOUNTER (OUTPATIENT)
Dept: RADIOLOGY | Facility: HOSPITAL | Age: 55
Discharge: HOME | End: 2025-04-28
Attending: NURSE PRACTITIONER
Payer: COMMERCIAL

## 2025-04-28 DIAGNOSIS — R25.1 TREMORS OF NERVOUS SYSTEM: ICD-10-CM

## 2025-04-28 DIAGNOSIS — G25.0 ESSENTIAL TREMOR: ICD-10-CM

## 2025-04-28 DIAGNOSIS — G25.1 MEDICATION-INDUCED POSTURAL TREMOR: ICD-10-CM

## 2025-04-28 PROCEDURE — 70551 MRI BRAIN STEM W/O DYE: CPT | Mod: 26,,, | Performed by: RADIOLOGY

## 2025-04-28 PROCEDURE — 70551 MRI BRAIN STEM W/O DYE: CPT | Mod: TC

## 2025-05-01 ENCOUNTER — RESULTS FOLLOW-UP (OUTPATIENT)
Dept: NEUROLOGY | Facility: CLINIC | Age: 55
End: 2025-05-01

## 2025-08-29 ENCOUNTER — OFFICE VISIT (OUTPATIENT)
Dept: NEUROLOGY | Facility: CLINIC | Age: 55
End: 2025-08-29
Payer: COMMERCIAL

## 2025-08-29 ENCOUNTER — LAB VISIT (OUTPATIENT)
Dept: LAB | Facility: HOSPITAL | Age: 55
End: 2025-08-29
Attending: NURSE PRACTITIONER
Payer: COMMERCIAL

## 2025-08-29 DIAGNOSIS — R41.89 COGNITIVE CHANGES: ICD-10-CM

## 2025-08-29 DIAGNOSIS — F31.32 BIPOLAR DISORDER WITH MODERATE DEPRESSION: ICD-10-CM

## 2025-08-29 DIAGNOSIS — F41.9 ANXIETY: ICD-10-CM

## 2025-08-29 DIAGNOSIS — F43.10 PTSD (POST-TRAUMATIC STRESS DISORDER): ICD-10-CM

## 2025-08-29 PROCEDURE — 36415 COLL VENOUS BLD VENIPUNCTURE: CPT

## 2025-08-29 PROCEDURE — 84393 TAU PHOSPHORYLATED EA: CPT

## 2025-08-29 PROCEDURE — 83520 IMMUNOASSAY QUANT NOS NONAB: CPT

## 2025-09-03 LAB
IMMUNOLOGIST REVIEW: NORMAL
M PHOSPHO-TAU 217: 0.06 PG/ML

## 2025-09-05 LAB — LC PHOSPHO-TAU (181P): 0.56 PG/ML (ref 0–0.95)

## (undated) DEVICE — APPLICATOR CHLORAPREP ORN 26ML

## (undated) DEVICE — OBTURATOR BLADELESS 8MM XI CLR

## (undated) DEVICE — SEE MEDLINE ITEM 157117

## (undated) DEVICE — DRAPE COLUMN DAVINCI XI

## (undated) DEVICE — Device

## (undated) DEVICE — GLOVE BIOGEL PI MICRO SZ 7

## (undated) DEVICE — CANNULA REDUCER 12-8MM

## (undated) DEVICE — MANIFOLD 4 PORT

## (undated) DEVICE — SEAL UNIVERSAL 5MM-8MM XI

## (undated) DEVICE — SUT CTD VICRYL 0 UND BR SUT

## (undated) DEVICE — ELECTRODE REM PLYHSV RETURN 9

## (undated) DEVICE — NDL SAFETY 25G X 1.5 ECLIPSE

## (undated) DEVICE — UNDERGLOVE BIOGEL PI SZ 6.5 LF

## (undated) DEVICE — STAPLER SUREFORM 60 SPU

## (undated) DEVICE — COVER OVERHEAD SURG LT BLUE

## (undated) DEVICE — DRAPE ARM DAVINCI XI

## (undated) DEVICE — DRAPE ABDOMINAL TIBURON 14X11

## (undated) DEVICE — KIT FIBRIN SEALANT EVICEL 5 ML

## (undated) DEVICE — SUT STRATAFIX 2-0 30CM

## (undated) DEVICE — SEE MEDLINE ITEM 152622

## (undated) DEVICE — GLOVE BIOGEL PI MICRO SZ 6

## (undated) DEVICE — DEVICE CLOSURE DISP 14G

## (undated) DEVICE — UNDERGLOVES BIOGEL PI SZ 7 LF

## (undated) DEVICE — SEALER VESSEL EXTEND

## (undated) DEVICE — RELOAD SUREFORM 60 4.3 GRN 6R

## (undated) DEVICE — CANNULA SEAL 12MM

## (undated) DEVICE — KIT ANTIFOG

## (undated) DEVICE — COVER TIP CURVED SCISSORS XI

## (undated) DEVICE — SOL NS 1000CC

## (undated) DEVICE — SUT MONOCRYL 4.0 PS2 CP496G

## (undated) DEVICE — SYR 10CC LUER LOCK

## (undated) DEVICE — SYR 3CC LUER LOC

## (undated) DEVICE — RELOAD SUREFORM 60 3.5 BLU 6R

## (undated) DEVICE — KIT WING PAD POSITIONING

## (undated) DEVICE — SEE MEDLINE ITEM 157027